# Patient Record
Sex: FEMALE | Race: WHITE | NOT HISPANIC OR LATINO | Employment: OTHER | URBAN - METROPOLITAN AREA
[De-identification: names, ages, dates, MRNs, and addresses within clinical notes are randomized per-mention and may not be internally consistent; named-entity substitution may affect disease eponyms.]

---

## 2023-03-09 LAB
ANION GAP IN SER/PLAS: 14 MMOL/L (ref 10–20)
CALCIUM (MG/DL) IN SER/PLAS: 9.3 MG/DL (ref 8.6–10.6)
CARBON DIOXIDE, TOTAL (MMOL/L) IN SER/PLAS: 29 MMOL/L (ref 21–32)
CHLORIDE (MMOL/L) IN SER/PLAS: 100 MMOL/L (ref 98–107)
CHOLESTEROL (MG/DL) IN SER/PLAS: 224 MG/DL (ref 0–199)
CHOLESTEROL IN HDL (MG/DL) IN SER/PLAS: 71.4 MG/DL
CHOLESTEROL/HDL RATIO: 3.1
CREATININE (MG/DL) IN SER/PLAS: 0.58 MG/DL (ref 0.5–1.05)
GFR FEMALE: >90 ML/MIN/1.73M2
GLUCOSE (MG/DL) IN SER/PLAS: 95 MG/DL (ref 74–99)
LDL: 130 MG/DL (ref 0–99)
POTASSIUM (MMOL/L) IN SER/PLAS: 5 MMOL/L (ref 3.5–5.3)
SODIUM (MMOL/L) IN SER/PLAS: 138 MMOL/L (ref 136–145)
THYROTROPIN (MIU/L) IN SER/PLAS BY DETECTION LIMIT <= 0.05 MIU/L: 1.9 MIU/L (ref 0.44–3.98)
TRIGLYCERIDE (MG/DL) IN SER/PLAS: 114 MG/DL (ref 0–149)
UREA NITROGEN (MG/DL) IN SER/PLAS: 17 MG/DL (ref 6–23)
VLDL: 23 MG/DL (ref 0–40)

## 2023-07-25 ENCOUNTER — OFFICE VISIT (OUTPATIENT)
Dept: PRIMARY CARE | Facility: CLINIC | Age: 68
End: 2023-07-25
Payer: MEDICARE

## 2023-07-25 VITALS
HEART RATE: 64 BPM | WEIGHT: 206 LBS | BODY MASS INDEX: 40.44 KG/M2 | DIASTOLIC BLOOD PRESSURE: 82 MMHG | HEIGHT: 60 IN | RESPIRATION RATE: 16 BRPM | SYSTOLIC BLOOD PRESSURE: 134 MMHG | OXYGEN SATURATION: 98 %

## 2023-07-25 DIAGNOSIS — M54.50 CHRONIC BILATERAL LOW BACK PAIN WITHOUT SCIATICA: Primary | ICD-10-CM

## 2023-07-25 DIAGNOSIS — Z12.31 ENCOUNTER FOR SCREENING MAMMOGRAM FOR MALIGNANT NEOPLASM OF BREAST: ICD-10-CM

## 2023-07-25 DIAGNOSIS — K21.9 GASTROESOPHAGEAL REFLUX DISEASE, UNSPECIFIED WHETHER ESOPHAGITIS PRESENT: ICD-10-CM

## 2023-07-25 DIAGNOSIS — M54.50 LUMBAR BACK PAIN: ICD-10-CM

## 2023-07-25 DIAGNOSIS — E03.9 HYPOTHYROIDISM, UNSPECIFIED TYPE: ICD-10-CM

## 2023-07-25 DIAGNOSIS — G89.29 CHRONIC BILATERAL LOW BACK PAIN WITHOUT SCIATICA: Primary | ICD-10-CM

## 2023-07-25 DIAGNOSIS — I10 PRIMARY HYPERTENSION: ICD-10-CM

## 2023-07-25 PROBLEM — J30.9 ALLERGIC RHINITIS: Status: ACTIVE | Noted: 2023-07-25

## 2023-07-25 PROBLEM — R51.9 HEADACHE: Status: ACTIVE | Noted: 2023-07-25

## 2023-07-25 PROBLEM — G25.81 RESTLESS LEG SYNDROME: Status: ACTIVE | Noted: 2023-07-25

## 2023-07-25 PROBLEM — N39.0 UTI (URINARY TRACT INFECTION): Status: ACTIVE | Noted: 2023-07-25

## 2023-07-25 PROBLEM — B00.1 COLD SORE: Status: ACTIVE | Noted: 2023-07-25

## 2023-07-25 PROBLEM — R53.83 FATIGUE: Status: ACTIVE | Noted: 2023-07-25

## 2023-07-25 PROBLEM — B02.9 SHINGLES: Status: ACTIVE | Noted: 2023-07-25

## 2023-07-25 PROBLEM — M79.10 MYALGIA: Status: ACTIVE | Noted: 2023-07-25

## 2023-07-25 PROBLEM — M54.16 LUMBAR NEURITIS: Status: ACTIVE | Noted: 2023-07-25

## 2023-07-25 PROBLEM — R19.5 POSITIVE COLORECTAL CANCER SCREENING USING COLOGUARD TEST: Status: ACTIVE | Noted: 2023-07-25

## 2023-07-25 PROCEDURE — 3075F SYST BP GE 130 - 139MM HG: CPT | Performed by: INTERNAL MEDICINE

## 2023-07-25 PROCEDURE — 1036F TOBACCO NON-USER: CPT | Performed by: INTERNAL MEDICINE

## 2023-07-25 PROCEDURE — 3079F DIAST BP 80-89 MM HG: CPT | Performed by: INTERNAL MEDICINE

## 2023-07-25 PROCEDURE — 99205 OFFICE O/P NEW HI 60 MIN: CPT | Performed by: INTERNAL MEDICINE

## 2023-07-25 PROCEDURE — 1125F AMNT PAIN NOTED PAIN PRSNT: CPT | Performed by: INTERNAL MEDICINE

## 2023-07-25 PROCEDURE — 1159F MED LIST DOCD IN RCRD: CPT | Performed by: INTERNAL MEDICINE

## 2023-07-25 PROCEDURE — 1160F RVW MEDS BY RX/DR IN RCRD: CPT | Performed by: INTERNAL MEDICINE

## 2023-07-25 RX ORDER — ATENOLOL 50 MG/1
TABLET ORAL
COMMUNITY
End: 2024-01-16 | Stop reason: SDUPTHER

## 2023-07-25 RX ORDER — OXYCODONE HYDROCHLORIDE 15 MG/1
15 TABLET ORAL EVERY 6 HOURS PRN
COMMUNITY
End: 2023-07-25 | Stop reason: SDUPTHER

## 2023-07-25 RX ORDER — ESOMEPRAZOLE MAGNESIUM 40 MG/1
1 CAPSULE, DELAYED RELEASE ORAL DAILY
COMMUNITY
Start: 2015-01-23 | End: 2023-09-21 | Stop reason: SDUPTHER

## 2023-07-25 RX ORDER — LEVOTHYROXINE SODIUM 88 UG/1
TABLET ORAL
COMMUNITY
End: 2024-01-25 | Stop reason: SDUPTHER

## 2023-07-25 RX ORDER — LISINOPRIL 5 MG/1
5 TABLET ORAL DAILY
COMMUNITY
End: 2023-09-05 | Stop reason: SDUPTHER

## 2023-07-25 RX ORDER — VALACYCLOVIR HYDROCHLORIDE 1 G/1
1 TABLET, FILM COATED ORAL 2 TIMES DAILY PRN
COMMUNITY
Start: 2022-01-17 | End: 2023-07-25 | Stop reason: ALTCHOICE

## 2023-07-25 RX ORDER — NALOXONE HYDROCHLORIDE 4 MG/.1ML
4 SPRAY NASAL AS NEEDED
Qty: 2 EACH | Refills: 0 | Status: SHIPPED | OUTPATIENT
Start: 2023-07-25 | End: 2023-11-07 | Stop reason: ALTCHOICE

## 2023-07-25 RX ORDER — OXYCODONE HYDROCHLORIDE 15 MG/1
15 TABLET ORAL EVERY 6 HOURS PRN
Qty: 30 TABLET | Refills: 0 | Status: SHIPPED | OUTPATIENT
Start: 2023-07-25 | End: 2023-07-26 | Stop reason: SDUPTHER

## 2023-07-25 RX ORDER — OMEPRAZOLE 40 MG/1
40 CAPSULE, DELAYED RELEASE ORAL DAILY
COMMUNITY
Start: 2022-12-08 | End: 2023-07-25 | Stop reason: ALTCHOICE

## 2023-07-25 RX ORDER — LORAZEPAM 0.5 MG/1
TABLET ORAL
COMMUNITY
Start: 2023-03-25 | End: 2023-07-25 | Stop reason: ALTCHOICE

## 2023-07-25 ASSESSMENT — PAIN SCALES - GENERAL: PAINLEVEL: 4

## 2023-07-25 ASSESSMENT — SOCIAL DETERMINANTS OF HEALTH (SDOH)
WITHIN THE LAST YEAR, HAVE YOU BEEN KICKED, HIT, SLAPPED, OR OTHERWISE PHYSICALLY HURT BY YOUR PARTNER OR EX-PARTNER?: NO
WITHIN THE LAST YEAR, HAVE YOU BEEN AFRAID OF YOUR PARTNER OR EX-PARTNER?: NO
WITHIN THE LAST YEAR, HAVE TO BEEN RAPED OR FORCED TO HAVE ANY KIND OF SEXUAL ACTIVITY BY YOUR PARTNER OR EX-PARTNER?: NO
WITHIN THE LAST YEAR, HAVE YOU BEEN HUMILIATED OR EMOTIONALLY ABUSED IN OTHER WAYS BY YOUR PARTNER OR EX-PARTNER?: NO

## 2023-07-25 ASSESSMENT — PATIENT HEALTH QUESTIONNAIRE - PHQ9
SUM OF ALL RESPONSES TO PHQ9 QUESTIONS 1 & 2: 0
2. FEELING DOWN, DEPRESSED OR HOPELESS: NOT AT ALL
1. LITTLE INTEREST OR PLEASURE IN DOING THINGS: NOT AT ALL

## 2023-07-26 ENCOUNTER — LAB (OUTPATIENT)
Dept: LAB | Facility: LAB | Age: 68
End: 2023-07-26
Payer: MEDICARE

## 2023-07-26 ENCOUNTER — OFFICE VISIT (OUTPATIENT)
Dept: PRIMARY CARE | Facility: CLINIC | Age: 68
End: 2023-07-26
Payer: MEDICARE

## 2023-07-26 VITALS — SYSTOLIC BLOOD PRESSURE: 146 MMHG | BODY MASS INDEX: 43.16 KG/M2 | WEIGHT: 221 LBS | DIASTOLIC BLOOD PRESSURE: 86 MMHG

## 2023-07-26 DIAGNOSIS — G89.29 OTHER CHRONIC PAIN: Primary | ICD-10-CM

## 2023-07-26 DIAGNOSIS — G89.29 OTHER CHRONIC PAIN: ICD-10-CM

## 2023-07-26 DIAGNOSIS — M25.50 ARTHRALGIA, UNSPECIFIED JOINT: ICD-10-CM

## 2023-07-26 LAB
RHEUMATOID FACTOR (IU/ML) IN SERUM OR PLASMA: <10 IU/ML (ref 0–15)
URATE (MG/DL) IN SER/PLAS: 4.5 MG/DL (ref 2.3–6.7)

## 2023-07-26 PROCEDURE — 1159F MED LIST DOCD IN RCRD: CPT | Performed by: STUDENT IN AN ORGANIZED HEALTH CARE EDUCATION/TRAINING PROGRAM

## 2023-07-26 PROCEDURE — 86039 ANTINUCLEAR ANTIBODIES (ANA): CPT

## 2023-07-26 PROCEDURE — 1160F RVW MEDS BY RX/DR IN RCRD: CPT | Performed by: STUDENT IN AN ORGANIZED HEALTH CARE EDUCATION/TRAINING PROGRAM

## 2023-07-26 PROCEDURE — 86225 DNA ANTIBODY NATIVE: CPT

## 2023-07-26 PROCEDURE — 86235 NUCLEAR ANTIGEN ANTIBODY: CPT

## 2023-07-26 PROCEDURE — 99214 OFFICE O/P EST MOD 30 MIN: CPT | Performed by: STUDENT IN AN ORGANIZED HEALTH CARE EDUCATION/TRAINING PROGRAM

## 2023-07-26 PROCEDURE — 1125F AMNT PAIN NOTED PAIN PRSNT: CPT | Performed by: STUDENT IN AN ORGANIZED HEALTH CARE EDUCATION/TRAINING PROGRAM

## 2023-07-26 PROCEDURE — 86431 RHEUMATOID FACTOR QUANT: CPT

## 2023-07-26 PROCEDURE — 3079F DIAST BP 80-89 MM HG: CPT | Performed by: STUDENT IN AN ORGANIZED HEALTH CARE EDUCATION/TRAINING PROGRAM

## 2023-07-26 PROCEDURE — 84550 ASSAY OF BLOOD/URIC ACID: CPT

## 2023-07-26 PROCEDURE — 3077F SYST BP >= 140 MM HG: CPT | Performed by: STUDENT IN AN ORGANIZED HEALTH CARE EDUCATION/TRAINING PROGRAM

## 2023-07-26 PROCEDURE — 86038 ANTINUCLEAR ANTIBODIES: CPT

## 2023-07-26 PROCEDURE — 1036F TOBACCO NON-USER: CPT | Performed by: STUDENT IN AN ORGANIZED HEALTH CARE EDUCATION/TRAINING PROGRAM

## 2023-07-26 PROCEDURE — 36415 COLL VENOUS BLD VENIPUNCTURE: CPT

## 2023-07-26 RX ORDER — OXYCODONE HYDROCHLORIDE 15 MG/1
15 TABLET ORAL EVERY 6 HOURS PRN
Qty: 90 TABLET | Refills: 0 | Status: SHIPPED | OUTPATIENT
Start: 2023-07-26 | End: 2023-08-18 | Stop reason: SDUPTHER

## 2023-07-26 NOTE — PROGRESS NOTES
Subjective   Patient ID: Fadumo Lassiter is a 68 y.o. female who presents for Establish Care and Continue pain medication.    HPI patient is a 68-year-old female with past medical history of hypertension, hypothyroidism, GERD, and chronic back pain presenting to the office today to establish care as a new patient.  The patient's PCP recently retired.  Has been on opiate pain medications for a number of years for chronic back pain. She is currently on oxycodone 15 mg every 6 hours as needed. Had been on oxycontin in the past and weaned off. Patient states that she is compliant with all of her other medications and takes them regularly.  The patient has no other acute complaints today.    Past medical history: As above  Past surgical history: Spinal fusion, laminectomy, bilateral hip replacement  Social history: Never smoker, social EtOH use, no illicit drug use  Family history: Hypertension    Review of Systems  10 point ROS was completed and is negative unless otherwise stated in the \[facility] [place] [facility]\""  Objective   /86   Wt 100 kg (221 lb)   BMI 43.16 kg/m²     Physical Exam  General: No acute distress  HEENT: EOMI  CV: Regular rate and rhythm, normal S1 and S2, no murmurs  Pulm: Clear to auscultation bilaterally, no wheezings, rales or rhonchi  Abd: Nondistended  MSK: 5/5 strength in all extremities  Skin: No rashes   Lymphatic: No lymphadenopathy  Assessment/Plan       Chronic low back pain  History of multiple spinal surgeries  -Follows with spine surgery  -Currently on  oxycodone 15mg q6h prn, had been prescribed by prior internist  -Had been on OxyContin in the past and was able to wean off  -OARRS report reviewed  -Discussed goal to wean off opiate pain medications within 6 months. Will transition oxycodone 15mg QID to 15mg TID over the next month with goal to continue to decrease by 20% per month  -Did note increase in restless legs when medication was decreased in the past, discussed ropinirole, would like to hold  off for now  -Had been on gabapentin and lyrica in the past that were stopped for side effects  -Consider pain management referral  -Ordered WHITNEY reflex BOBO, RF, CCP, uric acid and referral placed to rheumatology      Hypertension  -Continue lisinopril 5 mg daily  -Continue atenolol 50 mg daily    Hypothyroidism  -Continue levothyroxine 88 mcg daily    GERD  -Continue esomeprazole 40 mg daily    Health maintenance  -Mammogram previously ordered  -Colonoscopy 2022, follow in 10 years    RTC 1 month

## 2023-07-27 LAB
ANA PATTERN: ABNORMAL
ANA TITER: ABNORMAL
ANTI-CENTROMERE: <0.2 AI
ANTI-CHROMATIN: <0.2 AI
ANTI-DNA (DS): 1 IU/ML
ANTI-JO-1 IGG: <0.2 AI
ANTI-NUCLEAR ANTIBODY (ANA): POSITIVE
ANTI-RIBOSOMAL P: <0.2 AI
ANTI-RNP: <0.2 AI
ANTI-SCL-70: <0.2 AI
ANTI-SM/RNP: <0.2 AI
ANTI-SM: <0.2 AI
ANTI-SSA: <0.2 AI
ANTI-SSB: <0.2 AI

## 2023-07-29 NOTE — PROGRESS NOTES
Subjective   Fadumo Lassiter is a 68 y.o. female who presents for New Patient Visit.  Patient presents to [facility].  She was previously seeing Dr. Fischer.  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  She reports multiple back surgeries.  She follows with Dae Akbar and [facility].  She has seen pain management in the past, per her former primary was managing her chronic opioids.  She states she has tried gabapentin in the past but it did not work.  Advised that this physician does not prescribe chronic opioids and she would be referred to pain management.    Patient states she wants a primary physician who will manage her chronic opioids as well.  She was given multiple referrals.        Objective     /82 (BP Location: Right arm, Patient Position: Sitting, BP Cuff Size: Adult)   Pulse 64   Resp 16   Ht 1.524 m (5')   Wt 93.4 kg (206 lb)   SpO2 98%   BMI 40.23 kg/m²      Physical Exam  Constitutional:       Appearance: Normal appearance.   HENT:      Head: Normocephalic and atraumatic.      Nose: Nose normal.      Mouth/Throat:      Mouth: Mucous membranes are moist.      Pharynx: Oropharynx is clear.   Eyes:      Extraocular Movements: Extraocular movements intact.      Pupils: Pupils are equal, round, and reactive to light.   Cardiovascular:      Rate and Rhythm: Normal rate and regular rhythm.   Pulmonary:      Effort: No respiratory distress.      Breath sounds: Normal breath sounds. No wheezing, rhonchi or rales.   Abdominal:      General: Bowel sounds are normal. There is no distension.      Palpations: Abdomen is soft.      Tenderness: There is no abdominal tenderness. There is no guarding.   Musculoskeletal:      Right lower leg: No edema.      Left lower leg: No edema.   Skin:     General: Skin is warm and dry.   Neurological:      Mental Status: She is alert and oriented to person, place, and time. Mental status is at baseline.   Psychiatric:         Mood and Affect:  Mood normal.         Behavior: Behavior normal.         Assessment/Plan   Problem List Items Addressed This Visit       GERD (gastroesophageal reflux disease)    Hypertension    Hypothyroidism    Lumbar back pain     Other Visit Diagnoses       Chronic bilateral low back pain without sciatica    -  Primary    Relevant Medications    naloxone (Narcan) 4 mg/0.1 mL nasal spray    oxyCODONE (Roxicodone) 15 mg immediate release tablet    Other Relevant Orders    Referral to Pain Medicine    Encounter for screening mammogram for malignant neoplasm of breast        Relevant Orders    BI mammo bilateral screening tomosynthesis          Patient given a prescription for opioids to avoid withdrawal  Referred to pain management  Given multiple referrals for new PCP       Miguel Angel Suárez DO

## 2023-08-07 ENCOUNTER — TELEPHONE (OUTPATIENT)
Dept: PRIMARY CARE | Facility: CLINIC | Age: 68
End: 2023-08-07
Payer: MEDICARE

## 2023-08-07 NOTE — TELEPHONE ENCOUNTER
(Has an upcoming appt on 9/5)  calling today and having upper neck & back pain that is causing muscle spasms.

## 2023-08-08 ENCOUNTER — TELEPHONE (OUTPATIENT)
Dept: PRIMARY CARE | Facility: CLINIC | Age: 68
End: 2023-08-08
Payer: MEDICARE

## 2023-08-08 NOTE — TELEPHONE ENCOUNTER
Patient would like a call back from you, has injured neck,  back, shoulder and very painful. Having a hard time walking, eating and sleeping. Did try aleeve, and heat. Not helping,  [phone]

## 2023-08-18 DIAGNOSIS — M54.50 CHRONIC BILATERAL LOW BACK PAIN WITHOUT SCIATICA: ICD-10-CM

## 2023-08-18 DIAGNOSIS — G89.29 CHRONIC BILATERAL LOW BACK PAIN WITHOUT SCIATICA: ICD-10-CM

## 2023-08-20 RX ORDER — OXYCODONE HYDROCHLORIDE 15 MG/1
15 TABLET ORAL 3 TIMES DAILY PRN
Qty: 90 TABLET | Refills: 0 | Status: SHIPPED | OUTPATIENT
Start: 2023-08-20 | End: 2023-09-21 | Stop reason: SDUPTHER

## 2023-08-22 PROBLEM — N76.0 VULVOVAGINITIS: Status: ACTIVE | Noted: 2023-08-22

## 2023-08-22 PROBLEM — N64.4 BREAST PAIN: Status: ACTIVE | Noted: 2023-08-22

## 2023-08-22 PROBLEM — N60.09 CYST OF BREAST: Status: ACTIVE | Noted: 2023-08-22

## 2023-08-22 PROBLEM — N89.8 VAGINAL DISCHARGE: Status: ACTIVE | Noted: 2023-08-22

## 2023-08-22 PROBLEM — B35.9 DERMATOPHYTOSIS: Status: ACTIVE | Noted: 2023-08-22

## 2023-08-22 PROBLEM — M48.02 CERVICAL STENOSIS OF SPINE: Status: ACTIVE | Noted: 2023-08-22

## 2023-08-22 RX ORDER — CYCLOBENZAPRINE HCL 10 MG
1 TABLET ORAL 2 TIMES DAILY
COMMUNITY
Start: 2023-08-07 | End: 2023-09-05 | Stop reason: ALTCHOICE

## 2023-08-22 RX ORDER — LORAZEPAM 0.5 MG/1
1 TABLET ORAL
COMMUNITY
End: 2023-09-05 | Stop reason: ALTCHOICE

## 2023-08-22 RX ORDER — METHOCARBAMOL 500 MG/1
TABLET, FILM COATED ORAL AS NEEDED
COMMUNITY
End: 2023-09-05 | Stop reason: ALTCHOICE

## 2023-08-22 RX ORDER — FLUTICASONE PROPIONATE 50 MCG
2 SPRAY, SUSPENSION (ML) NASAL DAILY
COMMUNITY
End: 2023-09-05 | Stop reason: ALTCHOICE

## 2023-08-22 RX ORDER — METHYLPREDNISOLONE 4 MG/1
TABLET ORAL
COMMUNITY
Start: 2023-08-07 | End: 2023-09-05 | Stop reason: ALTCHOICE

## 2023-08-22 RX ORDER — NYSTATIN 100000 U/G
1 CREAM TOPICAL 2 TIMES DAILY
COMMUNITY
Start: 2017-09-21 | End: 2023-09-05 | Stop reason: ALTCHOICE

## 2023-08-29 RX ORDER — BIMATOPROST 3 UG/ML
1 SOLUTION TOPICAL DAILY
COMMUNITY
End: 2023-10-03 | Stop reason: ALTCHOICE

## 2023-08-29 RX ORDER — VALACYCLOVIR HYDROCHLORIDE 1 G/1
1000 TABLET, FILM COATED ORAL 2 TIMES DAILY PRN
COMMUNITY
End: 2023-09-05 | Stop reason: ALTCHOICE

## 2023-09-05 ENCOUNTER — APPOINTMENT (OUTPATIENT)
Dept: PRIMARY CARE | Facility: CLINIC | Age: 68
End: 2023-09-05
Payer: MEDICARE

## 2023-09-05 ENCOUNTER — OFFICE VISIT (OUTPATIENT)
Dept: PRIMARY CARE | Facility: CLINIC | Age: 68
End: 2023-09-05
Payer: MEDICARE

## 2023-09-05 VITALS — SYSTOLIC BLOOD PRESSURE: 150 MMHG | DIASTOLIC BLOOD PRESSURE: 98 MMHG | BODY MASS INDEX: 42.77 KG/M2 | WEIGHT: 219 LBS

## 2023-09-05 DIAGNOSIS — Z00.00 ROUTINE GENERAL MEDICAL EXAMINATION AT HEALTH CARE FACILITY: Primary | ICD-10-CM

## 2023-09-05 DIAGNOSIS — I10 HYPERTENSION, UNSPECIFIED TYPE: ICD-10-CM

## 2023-09-05 DIAGNOSIS — M54.50 LUMBAR BACK PAIN: ICD-10-CM

## 2023-09-05 DIAGNOSIS — M54.2 NECK PAIN: ICD-10-CM

## 2023-09-05 PROCEDURE — G0439 PPPS, SUBSEQ VISIT: HCPCS | Performed by: STUDENT IN AN ORGANIZED HEALTH CARE EDUCATION/TRAINING PROGRAM

## 2023-09-05 PROCEDURE — 3077F SYST BP >= 140 MM HG: CPT | Performed by: STUDENT IN AN ORGANIZED HEALTH CARE EDUCATION/TRAINING PROGRAM

## 2023-09-05 PROCEDURE — 1170F FXNL STATUS ASSESSED: CPT | Performed by: STUDENT IN AN ORGANIZED HEALTH CARE EDUCATION/TRAINING PROGRAM

## 2023-09-05 PROCEDURE — 1036F TOBACCO NON-USER: CPT | Performed by: STUDENT IN AN ORGANIZED HEALTH CARE EDUCATION/TRAINING PROGRAM

## 2023-09-05 PROCEDURE — 1125F AMNT PAIN NOTED PAIN PRSNT: CPT | Performed by: STUDENT IN AN ORGANIZED HEALTH CARE EDUCATION/TRAINING PROGRAM

## 2023-09-05 PROCEDURE — 99214 OFFICE O/P EST MOD 30 MIN: CPT | Performed by: STUDENT IN AN ORGANIZED HEALTH CARE EDUCATION/TRAINING PROGRAM

## 2023-09-05 PROCEDURE — 3079F DIAST BP 80-89 MM HG: CPT | Performed by: STUDENT IN AN ORGANIZED HEALTH CARE EDUCATION/TRAINING PROGRAM

## 2023-09-05 PROCEDURE — 3080F DIAST BP >= 90 MM HG: CPT | Performed by: STUDENT IN AN ORGANIZED HEALTH CARE EDUCATION/TRAINING PROGRAM

## 2023-09-05 PROCEDURE — 1160F RVW MEDS BY RX/DR IN RCRD: CPT | Performed by: STUDENT IN AN ORGANIZED HEALTH CARE EDUCATION/TRAINING PROGRAM

## 2023-09-05 PROCEDURE — 1159F MED LIST DOCD IN RCRD: CPT | Performed by: STUDENT IN AN ORGANIZED HEALTH CARE EDUCATION/TRAINING PROGRAM

## 2023-09-05 RX ORDER — LISINOPRIL 5 MG/1
5 TABLET ORAL DAILY
Qty: 90 TABLET | Refills: 1 | Status: SHIPPED | OUTPATIENT
Start: 2023-09-05 | End: 2024-02-27 | Stop reason: SDUPTHER

## 2023-09-05 ASSESSMENT — ACTIVITIES OF DAILY LIVING (ADL)
MANAGING_FINANCES: INDEPENDENT
TAKING_MEDICATION: INDEPENDENT
GROCERY_SHOPPING: INDEPENDENT
DOING_HOUSEWORK: INDEPENDENT
DRESSING: INDEPENDENT
BATHING: INDEPENDENT

## 2023-09-05 ASSESSMENT — PATIENT HEALTH QUESTIONNAIRE - PHQ9
2. FEELING DOWN, DEPRESSED OR HOPELESS: NOT AT ALL
SUM OF ALL RESPONSES TO PHQ9 QUESTIONS 1 AND 2: 0
1. LITTLE INTEREST OR PLEASURE IN DOING THINGS: NOT AT ALL

## 2023-09-05 NOTE — PROGRESS NOTES
Subjective   Reason for Visit: Fadumo Lassiter is an 68 y.o. female here for a Medicare Wellness visit.     Past Medical, Surgical, and Family History reviewed and updated in chart.    Reviewed all medications by prescribing practitioner or clinical pharmacist (such as prescriptions, OTCs, herbal therapies and supplements) and documented in the medical record.    HPI    Presents for follow-up. Since last appointment had an acute flare of neck pain, had been prescribed muscle relaxer and steroids with some improvement. Has not been able to exercise as usual since that time as well as decrease in pain medication         Patient Care Team:  Jim Sumner MD as PCP - General (Internal Medicine)     Review of Systems    8 point review of systems is otherwise negative unless mentioned on HPI      Objective   Vitals:  BP (!) 150/98   Wt 99.3 kg (219 lb)   BMI 42.77 kg/m²       Physical Exam    General: No acute distress  HEENT: EOMI  CV: Regular rate and rhythm, normal S1 and S2, no murmurs  Pulm: Clear to auscultation bilaterally, no wheezings, rales or rhonchi  Abd: Nondistended  MSK: 5/5 strength in all extremities  Skin: No rashes   Lymphatic: No lymphadenopathy      Assessment/Plan   Problem List Items Addressed This Visit          Musculoskeletal and Injuries    Lumbar back pain     Other Visit Diagnoses       Routine general medical examination at health care facility    -  Primary    Hypertension, unspecified type        Neck pain              Chronic low back pain  History of multiple spinal surgeries  -Follows with spine surgery  -Had been on OxyContin in the past and was able to wean off  -OARRS report reviewed  -Discussed goal to wean off opiate pain medications within 6 months. At last visit oxycodone 15mg QID to 15mg TID over the next month with goal to continue to decrease by 20% per month  -Will keep dose the same for now, next month will consider maintaining the 15mg dose and taking 2.5mg tablets  daily  -Did note increase in restless legs when medication was decreased in the past, discussed ropinirole, would like to hold off for now  -Had been on gabapentin and lyrica in the past that were stopped for side effects  -Positive WHITNEY, plans to follow-up with rheumatology  -Not currently interested in injections at this time to follow-up with pain management       Hypertension  -Continue lisinopril 5 mg daily  -Continue atenolol 25 mg daily (takes 1/2 50mg atenolol), may consider increasing back to 50mg in the future       Hypothyroidism  -Continue levothyroxine 88 mcg daily     GERD  -Continue esomeprazole 40 mg daily     Health maintenance  -Mammogram previously ordered  -Colonoscopy 2022, follow in 10 years     RTC 1 month

## 2023-09-15 ENCOUNTER — TELEPHONE (OUTPATIENT)
Dept: PRIMARY CARE | Facility: CLINIC | Age: 68
End: 2023-09-15
Payer: MEDICARE

## 2023-09-15 DIAGNOSIS — N39.0 URINARY TRACT INFECTION WITHOUT HEMATURIA, SITE UNSPECIFIED: Primary | ICD-10-CM

## 2023-09-15 RX ORDER — SULFAMETHOXAZOLE AND TRIMETHOPRIM 800; 160 MG/1; MG/1
1 TABLET ORAL 2 TIMES DAILY
Qty: 6 TABLET | Refills: 0 | Status: SHIPPED | OUTPATIENT
Start: 2023-09-15 | End: 2023-09-18

## 2023-09-15 NOTE — TELEPHONE ENCOUNTER
Patient is experiencing an UTI and asked if you could call in something to her [facility] [place] at [facility].

## 2023-09-21 DIAGNOSIS — G89.29 CHRONIC BILATERAL LOW BACK PAIN WITHOUT SCIATICA: ICD-10-CM

## 2023-09-21 DIAGNOSIS — M54.50 CHRONIC BILATERAL LOW BACK PAIN WITHOUT SCIATICA: ICD-10-CM

## 2023-09-21 DIAGNOSIS — K21.9 GASTROESOPHAGEAL REFLUX DISEASE WITHOUT ESOPHAGITIS: Primary | ICD-10-CM

## 2023-09-21 RX ORDER — ESOMEPRAZOLE MAGNESIUM 40 MG/1
40 CAPSULE, DELAYED RELEASE ORAL DAILY
Qty: 30 CAPSULE | Refills: 0 | Status: SHIPPED | OUTPATIENT
Start: 2023-09-21 | End: 2023-10-31 | Stop reason: SDUPTHER

## 2023-09-21 RX ORDER — OXYCODONE HYDROCHLORIDE 15 MG/1
15 TABLET ORAL 3 TIMES DAILY PRN
Qty: 90 TABLET | Refills: 0 | Status: SHIPPED | OUTPATIENT
Start: 2023-09-21 | End: 2023-10-24 | Stop reason: SDUPTHER

## 2023-09-21 NOTE — TELEPHONE ENCOUNTER
Requesting refills on esomprazole (which I ordered) also requesting refills on oxycodone (what was discussed in office visit)  Just so you know, patient does have a drug contract/screen with dr. Javi dominguez. (Did not order)

## 2023-10-03 ENCOUNTER — LAB (OUTPATIENT)
Dept: LAB | Facility: LAB | Age: 68
End: 2023-10-03
Payer: MEDICARE

## 2023-10-03 ENCOUNTER — OFFICE VISIT (OUTPATIENT)
Dept: RHEUMATOLOGY | Facility: CLINIC | Age: 68
End: 2023-10-03
Payer: MEDICARE

## 2023-10-03 VITALS
BODY MASS INDEX: 43.59 KG/M2 | WEIGHT: 222 LBS | SYSTOLIC BLOOD PRESSURE: 145 MMHG | DIASTOLIC BLOOD PRESSURE: 83 MMHG | TEMPERATURE: 98.4 F | HEART RATE: 62 BPM | HEIGHT: 60 IN

## 2023-10-03 DIAGNOSIS — M25.50 ARTHRALGIA, UNSPECIFIED JOINT: ICD-10-CM

## 2023-10-03 DIAGNOSIS — M25.50 ARTHRALGIA, UNSPECIFIED JOINT: Primary | ICD-10-CM

## 2023-10-03 LAB
ALBUMIN SERPL BCP-MCNC: 4 G/DL (ref 3.4–5)
ALP SERPL-CCNC: 144 U/L (ref 33–136)
ALT SERPL W P-5'-P-CCNC: 17 U/L (ref 7–45)
ANION GAP SERPL CALC-SCNC: 13 MMOL/L (ref 10–20)
AST SERPL W P-5'-P-CCNC: 19 U/L (ref 9–39)
BASOPHILS # BLD AUTO: 0.06 X10*3/UL (ref 0–0.1)
BASOPHILS NFR BLD AUTO: 0.8 %
BILIRUB SERPL-MCNC: 0.5 MG/DL (ref 0–1.2)
BUN SERPL-MCNC: 16 MG/DL (ref 6–23)
CALCIUM SERPL-MCNC: 9.2 MG/DL (ref 8.6–10.3)
CHLORIDE SERPL-SCNC: 103 MMOL/L (ref 98–107)
CO2 SERPL-SCNC: 29 MMOL/L (ref 21–32)
CREAT SERPL-MCNC: 0.66 MG/DL (ref 0.5–1.05)
CRP SERPL-MCNC: 0.57 MG/DL
EOSINOPHIL # BLD AUTO: 0.25 X10*3/UL (ref 0–0.7)
EOSINOPHIL NFR BLD AUTO: 3.4 %
ERYTHROCYTE [DISTWIDTH] IN BLOOD BY AUTOMATED COUNT: 12.6 % (ref 11.5–14.5)
ERYTHROCYTE [SEDIMENTATION RATE] IN BLOOD BY WESTERGREN METHOD: 6 MM/H (ref 0–30)
GFR SERPL CREATININE-BSD FRML MDRD: >90 ML/MIN/1.73M*2
GLUCOSE SERPL-MCNC: 101 MG/DL (ref 74–99)
HCT VFR BLD AUTO: 41.7 % (ref 36–46)
HGB BLD-MCNC: 13.7 G/DL (ref 12–16)
IMM GRANULOCYTES # BLD AUTO: 0.02 X10*3/UL (ref 0–0.7)
IMM GRANULOCYTES NFR BLD AUTO: 0.3 % (ref 0–0.9)
LYMPHOCYTES # BLD AUTO: 1.95 X10*3/UL (ref 1.2–4.8)
LYMPHOCYTES NFR BLD AUTO: 26.4 %
MCH RBC QN AUTO: 30.2 PG (ref 26–34)
MCHC RBC AUTO-ENTMCNC: 32.9 G/DL (ref 32–36)
MCV RBC AUTO: 92 FL (ref 80–100)
MONOCYTES # BLD AUTO: 0.57 X10*3/UL (ref 0.1–1)
MONOCYTES NFR BLD AUTO: 7.7 %
NEUTROPHILS # BLD AUTO: 4.54 X10*3/UL (ref 1.2–7.7)
NEUTROPHILS NFR BLD AUTO: 61.4 %
NRBC BLD-RTO: 0 /100 WBCS (ref 0–0)
PLATELET # BLD AUTO: 311 X10*3/UL (ref 150–450)
PMV BLD AUTO: 9.6 FL (ref 7.5–11.5)
POTASSIUM SERPL-SCNC: 5.3 MMOL/L (ref 3.5–5.3)
PROT SERPL-MCNC: 6.7 G/DL (ref 6.4–8.2)
RBC # BLD AUTO: 4.54 X10*6/UL (ref 4–5.2)
SODIUM SERPL-SCNC: 140 MMOL/L (ref 136–145)
WBC # BLD AUTO: 7.4 X10*3/UL (ref 4.4–11.3)

## 2023-10-03 PROCEDURE — 1036F TOBACCO NON-USER: CPT | Performed by: PHYSICIAN ASSISTANT

## 2023-10-03 PROCEDURE — 3079F DIAST BP 80-89 MM HG: CPT | Performed by: PHYSICIAN ASSISTANT

## 2023-10-03 PROCEDURE — 81001 URINALYSIS AUTO W/SCOPE: CPT

## 2023-10-03 PROCEDURE — 36415 COLL VENOUS BLD VENIPUNCTURE: CPT

## 2023-10-03 PROCEDURE — 1159F MED LIST DOCD IN RCRD: CPT | Performed by: PHYSICIAN ASSISTANT

## 2023-10-03 PROCEDURE — 82570 ASSAY OF URINE CREATININE: CPT

## 2023-10-03 PROCEDURE — 1125F AMNT PAIN NOTED PAIN PRSNT: CPT | Performed by: PHYSICIAN ASSISTANT

## 2023-10-03 PROCEDURE — 1160F RVW MEDS BY RX/DR IN RCRD: CPT | Performed by: PHYSICIAN ASSISTANT

## 2023-10-03 PROCEDURE — 84156 ASSAY OF PROTEIN URINE: CPT

## 2023-10-03 PROCEDURE — 99205 OFFICE O/P NEW HI 60 MIN: CPT | Performed by: PHYSICIAN ASSISTANT

## 2023-10-03 PROCEDURE — 3077F SYST BP >= 140 MM HG: CPT | Performed by: PHYSICIAN ASSISTANT

## 2023-10-03 NOTE — PROGRESS NOTES
69 y/o female patient referred by Dr. Sumner for arthralgia and positive WHITNEY    Patient reports significant back pain, often radiates down the back of her legs into her toes. She also has pain in her neck radiating down her arms. Multiple spinal fusions & laminectomy, prior injections. She is following with her spine surgeons. She reports bilateral shoulder, hip, and knee replacements. Her pain is aggravated by specific movements. Her body pain is worst at the end of the day. No significant joint swelling. Mild morning stiffness in hands that occurs rarely, less than a few minutes.    She is taking oxycodone for her back pain. She is working with PCP to taper down.    Denies: chest pain, shortness of breath, abdominal pain, bloody stool, malar rash, photosensitivity, other rash, recurrent ulcers in mouth / nose, Raynauds    Medical: spina bifida, hypothyroid, osteoarthritis, GERD, hypertension, benign spine tumor, restless leg syndrome  Surgical: b/l shoulder arthroplasty, b/l knee arthroplasty, b/l hip arthoplasty, ovarian cyst excision, nose surgery  Social: retired from marketing, no tobacco use, occasional red wine  Family: no SLE, no RA, no PsA, no UC, no Crohns    WHITNEY 1:80, BOBO negative  Uric Acid WNL  RF negative    ROS  As per the HPI, otherwise negative.    PE:  Constitutional: Well developed, in no acute distress, alert and cooperative  Eyes: Anicteric sclerae  ENMT: No malar rash  Respiratory/Thorax: Patent airways, symmetric chest with good expansion  Cardiovascular: Regular rate   Neurological: alert and oriented x 3  Psychological: Appropriate mood and behavior  Skin: Warm and dry, no lesions, no rashes, no ulceration    Musculoskeletal -  Hands/Fingers: + Heberden & Darrius nodes. + CMC squaring & grind. No active synovitis of the DIP, PIP, or MCP joints with palpation. No pitting or spooning of the nails bilaterally. No clubbing/cyanosis. No erythema, no swelling, or pain with palpation, full  ROM.  Wrists: No erythema, no swelling, or pain with palpation, full ROM.  Elbows: No erythema, no swelling, or pain with palpation, full ROM.  Shoulders: No swelling, or pain with palpation, full ROM.  Lower extremities: No peripheral edema  Hips: No obvious deformities.   Knees: No erythema, no swelling, or pain with palpation, full ROM.  Ankles, Feet: No erythema, no swelling, or pain with palpation, full ROM. No ulceration.  Normal gait     Assessment:  Positive WHITNEY - will complete additional workup.    She is following closely with orthopedic team for her spine, she has PT referral from PCP.    Joint pain is consistent with osteoarthritis. We discussed topical diclofenac sodium gel on her finger joints in addition to her other strategies. No signs of inflammatory arthritis.    You can schedule an appointment by calling [phone]  You can reach the rheumatology office by calling [phone]    Plan:  - Lab ordered  - I will call you when results return    Norma Briones PA-C  Rheumatology     10/4: Called patient and reviewed results. No signs of rheumatologic WHITNEY related disorder. All of her questions and concerns were addressed. She will follow up if new concerns develop.

## 2023-10-04 LAB
C3 SERPL-MCNC: 126 MG/DL (ref 87–200)
C4 SERPL-MCNC: 40 MG/DL (ref 10–50)
CREAT UR-MCNC: 129.8 MG/DL (ref 20–320)
MUCOUS THREADS #/AREA URNS AUTO: NORMAL /LPF
PROT UR-ACNC: 11 MG/DL (ref 5–24)
PROT/CREAT UR: 0.08 MG/MG CREAT (ref 0–0.17)
RBC #/AREA URNS AUTO: NORMAL /HPF
SQUAMOUS #/AREA URNS AUTO: NORMAL /HPF
WBC #/AREA URNS AUTO: NORMAL /HPF

## 2023-10-10 ENCOUNTER — OFFICE VISIT (OUTPATIENT)
Dept: PRIMARY CARE | Facility: CLINIC | Age: 68
End: 2023-10-10
Payer: MEDICARE

## 2023-10-10 VITALS — BODY MASS INDEX: 44.33 KG/M2 | SYSTOLIC BLOOD PRESSURE: 153 MMHG | DIASTOLIC BLOOD PRESSURE: 80 MMHG | WEIGHT: 227 LBS

## 2023-10-10 DIAGNOSIS — M54.50 LUMBAR BACK PAIN: ICD-10-CM

## 2023-10-10 DIAGNOSIS — R30.0 DYSURIA: Primary | ICD-10-CM

## 2023-10-10 PROCEDURE — 1159F MED LIST DOCD IN RCRD: CPT | Performed by: STUDENT IN AN ORGANIZED HEALTH CARE EDUCATION/TRAINING PROGRAM

## 2023-10-10 PROCEDURE — 1036F TOBACCO NON-USER: CPT | Performed by: STUDENT IN AN ORGANIZED HEALTH CARE EDUCATION/TRAINING PROGRAM

## 2023-10-10 PROCEDURE — 1125F AMNT PAIN NOTED PAIN PRSNT: CPT | Performed by: STUDENT IN AN ORGANIZED HEALTH CARE EDUCATION/TRAINING PROGRAM

## 2023-10-10 PROCEDURE — 99213 OFFICE O/P EST LOW 20 MIN: CPT | Performed by: STUDENT IN AN ORGANIZED HEALTH CARE EDUCATION/TRAINING PROGRAM

## 2023-10-10 PROCEDURE — 3079F DIAST BP 80-89 MM HG: CPT | Performed by: STUDENT IN AN ORGANIZED HEALTH CARE EDUCATION/TRAINING PROGRAM

## 2023-10-10 PROCEDURE — 1160F RVW MEDS BY RX/DR IN RCRD: CPT | Performed by: STUDENT IN AN ORGANIZED HEALTH CARE EDUCATION/TRAINING PROGRAM

## 2023-10-10 PROCEDURE — 3077F SYST BP >= 140 MM HG: CPT | Performed by: STUDENT IN AN ORGANIZED HEALTH CARE EDUCATION/TRAINING PROGRAM

## 2023-10-10 NOTE — PROGRESS NOTES
Follow up    Subjective   Patient ID: Fadumo Lassiter is a 68 y.o. female who presents for Follow-up.    HPI     Presents for followup. Since last visit has seen rheumatology with reassuring workup. Considering starting silvina chi. Motivated to decrease use of pain medications. Uses naproxen at times which has been helpful. Has had some foot cramping and restless legs has been more noticeable and pain medications are decreasing.     Review of Systems    8 point review of systems is otherwise negative unless mentioned on HPI      Objective   /80   Wt 103 kg (227 lb)   BMI 44.33 kg/m²     Physical Exam    General: No acute distress  HEENT: EOMI  Abd: Nondistended  MSK: 5/5 strength in all extremities  Skin: No rashes   Lymphatic: No lymphadenopathy      Assessment/Plan       Chronic low back pain  History of multiple spinal surgeries  -Follows with spine surgery  -Had been on OxyContin in the past and was able to wean off  -OARRS report reviewed  -Discussed goal to wean off opiate pain medications within 6 months. At last visit oxycodone 15mg QID to 15mg TID over the next month with goal to continue to decrease by 20% per month  -Will keep dose the same for now, next month will consider maintaining the 15mg dose and taking 2.5 tablets daily. Likely would decrease to the 1/2 for the middle of the day dose as next step  -Did note increase in restless legs when medication was decreased in the past, discussed ropinirole, would like to hold off for now  -Had been on gabapentin and lyrica in the past that were stopped for side effects  -Positive WHITNEY, has seen rheumatology with reassuring workup, considering starting silvina chi  -Not currently interested in injections at this time to follow-up with pain management   -Takes naproxen sparingly, discussed option of other NSAIDs (meloxicam or diclofenac), would like to continue with naproxen as needed for now       Hypertension  -Continue lisinopril 5 mg daily  -Continue  atenolol 25 mg daily (takes 1/2 50mg atenolol), may consider increasing back to 50mg in the future      Prior episode of dysuria  -Ordered UA/culture for if recurs again  -Discussed cranberry and D mannose supplements      Hypothyroidism  -Continue levothyroxine 88 mcg daily     GERD  -Continue esomeprazole 40 mg daily     Health maintenance  -Mammogram previously ordered  -Colonoscopy 2022, follow in 10 years     RTC 1 month

## 2023-10-13 ENCOUNTER — APPOINTMENT (OUTPATIENT)
Dept: ORTHOPEDIC SURGERY | Facility: CLINIC | Age: 68
End: 2023-10-13
Payer: MEDICARE

## 2023-10-24 DIAGNOSIS — G89.29 CHRONIC BILATERAL LOW BACK PAIN WITHOUT SCIATICA: ICD-10-CM

## 2023-10-24 DIAGNOSIS — M54.50 CHRONIC BILATERAL LOW BACK PAIN WITHOUT SCIATICA: ICD-10-CM

## 2023-10-25 RX ORDER — OXYCODONE HYDROCHLORIDE 15 MG/1
15 TABLET ORAL 3 TIMES DAILY PRN
Qty: 90 TABLET | Refills: 0 | Status: SHIPPED | OUTPATIENT
Start: 2023-10-25 | End: 2023-11-07 | Stop reason: ALTCHOICE

## 2023-10-31 DIAGNOSIS — K21.9 GASTROESOPHAGEAL REFLUX DISEASE WITHOUT ESOPHAGITIS: ICD-10-CM

## 2023-10-31 RX ORDER — ESOMEPRAZOLE MAGNESIUM 40 MG/1
40 CAPSULE, DELAYED RELEASE ORAL DAILY
Qty: 90 CAPSULE | Refills: 1 | Status: SHIPPED | OUTPATIENT
Start: 2023-10-31 | End: 2024-05-03 | Stop reason: SDUPTHER

## 2023-11-06 PROBLEM — N64.4 BREAST PAIN: Status: RESOLVED | Noted: 2023-08-22 | Resolved: 2023-11-06

## 2023-11-06 PROBLEM — R53.83 FATIGUE: Status: RESOLVED | Noted: 2023-07-25 | Resolved: 2023-11-06

## 2023-11-06 PROBLEM — B35.9 DERMATOPHYTOSIS: Status: RESOLVED | Noted: 2023-08-22 | Resolved: 2023-11-06

## 2023-11-06 PROBLEM — N60.09 CYST, BREAST: Status: RESOLVED | Noted: 2023-08-22 | Resolved: 2023-11-06

## 2023-11-06 PROBLEM — B00.1 COLD SORE: Status: RESOLVED | Noted: 2023-07-25 | Resolved: 2023-11-06

## 2023-11-06 PROBLEM — R51.9 HEADACHE: Status: RESOLVED | Noted: 2023-07-25 | Resolved: 2023-11-06

## 2023-11-06 PROBLEM — N39.0 UTI (URINARY TRACT INFECTION): Status: RESOLVED | Noted: 2023-07-25 | Resolved: 2023-11-06

## 2023-11-06 PROBLEM — R10.2 PELVIC PAIN IN FEMALE: Status: RESOLVED | Noted: 2023-11-06 | Resolved: 2023-11-06

## 2023-11-06 PROBLEM — N89.8 VAGINAL DISCHARGE: Status: RESOLVED | Noted: 2023-08-22 | Resolved: 2023-11-06

## 2023-11-06 PROBLEM — J30.9 ALLERGIC RHINITIS: Status: RESOLVED | Noted: 2023-07-25 | Resolved: 2023-11-06

## 2023-11-06 PROBLEM — M79.10 MYALGIA: Status: RESOLVED | Noted: 2023-07-25 | Resolved: 2023-11-06

## 2023-11-06 PROBLEM — R19.5 POSITIVE COLORECTAL CANCER SCREENING USING COLOGUARD TEST: Status: RESOLVED | Noted: 2023-07-25 | Resolved: 2023-11-06

## 2023-11-06 PROBLEM — M54.16 LUMBAR NEURITIS: Status: RESOLVED | Noted: 2023-07-25 | Resolved: 2023-11-06

## 2023-11-06 PROBLEM — N76.0 VULVOVAGINITIS: Status: RESOLVED | Noted: 2023-08-22 | Resolved: 2023-11-06

## 2023-11-07 ENCOUNTER — OFFICE VISIT (OUTPATIENT)
Dept: PRIMARY CARE | Facility: CLINIC | Age: 68
End: 2023-11-07
Payer: MEDICARE

## 2023-11-07 ENCOUNTER — LAB (OUTPATIENT)
Dept: LAB | Facility: LAB | Age: 68
End: 2023-11-07
Payer: MEDICARE

## 2023-11-07 VITALS — WEIGHT: 221 LBS | DIASTOLIC BLOOD PRESSURE: 86 MMHG | BODY MASS INDEX: 43.16 KG/M2 | SYSTOLIC BLOOD PRESSURE: 146 MMHG

## 2023-11-07 DIAGNOSIS — Z51.81 THERAPEUTIC DRUG MONITORING: ICD-10-CM

## 2023-11-07 DIAGNOSIS — R30.0 DYSURIA: Primary | ICD-10-CM

## 2023-11-07 DIAGNOSIS — R30.0 DYSURIA: ICD-10-CM

## 2023-11-07 DIAGNOSIS — M54.50 LUMBAR BACK PAIN: ICD-10-CM

## 2023-11-07 LAB
AMPHETAMINES UR QL SCN: NORMAL
APPEARANCE UR: ABNORMAL
BARBITURATES UR QL SCN: NORMAL
BILIRUB UR STRIP.AUTO-MCNC: NEGATIVE MG/DL
BZE UR QL SCN: NORMAL
CANNABINOIDS UR QL SCN: NORMAL
COLOR UR: ABNORMAL
CREAT UR-MCNC: 172.2 MG/DL (ref 20–320)
GLUCOSE UR STRIP.AUTO-MCNC: NEGATIVE MG/DL
KETONES UR STRIP.AUTO-MCNC: NEGATIVE MG/DL
LEUKOCYTE ESTERASE UR QL STRIP.AUTO: NEGATIVE
NITRITE UR QL STRIP.AUTO: NEGATIVE
PCP UR QL SCN: NORMAL
PH UR STRIP.AUTO: 5 [PH]
PROT UR STRIP.AUTO-MCNC: NEGATIVE MG/DL
RBC # UR STRIP.AUTO: NEGATIVE /UL
SP GR UR STRIP.AUTO: 1.02
UROBILINOGEN UR STRIP.AUTO-MCNC: <2 MG/DL

## 2023-11-07 PROCEDURE — 82570 ASSAY OF URINE CREATININE: CPT

## 2023-11-07 PROCEDURE — 80307 DRUG TEST PRSMV CHEM ANLYZR: CPT

## 2023-11-07 PROCEDURE — 99213 OFFICE O/P EST LOW 20 MIN: CPT | Performed by: STUDENT IN AN ORGANIZED HEALTH CARE EDUCATION/TRAINING PROGRAM

## 2023-11-07 PROCEDURE — 87086 URINE CULTURE/COLONY COUNT: CPT

## 2023-11-07 PROCEDURE — 1159F MED LIST DOCD IN RCRD: CPT | Performed by: STUDENT IN AN ORGANIZED HEALTH CARE EDUCATION/TRAINING PROGRAM

## 2023-11-07 PROCEDURE — 3079F DIAST BP 80-89 MM HG: CPT | Performed by: STUDENT IN AN ORGANIZED HEALTH CARE EDUCATION/TRAINING PROGRAM

## 2023-11-07 PROCEDURE — 80361 OPIATES 1 OR MORE: CPT

## 2023-11-07 PROCEDURE — 80365 DRUG SCREENING OXYCODONE: CPT

## 2023-11-07 PROCEDURE — 80346 BENZODIAZEPINES1-12: CPT

## 2023-11-07 PROCEDURE — 81003 URINALYSIS AUTO W/O SCOPE: CPT

## 2023-11-07 PROCEDURE — 80354 DRUG SCREENING FENTANYL: CPT

## 2023-11-07 PROCEDURE — 3077F SYST BP >= 140 MM HG: CPT | Performed by: STUDENT IN AN ORGANIZED HEALTH CARE EDUCATION/TRAINING PROGRAM

## 2023-11-07 PROCEDURE — 1160F RVW MEDS BY RX/DR IN RCRD: CPT | Performed by: STUDENT IN AN ORGANIZED HEALTH CARE EDUCATION/TRAINING PROGRAM

## 2023-11-07 PROCEDURE — 1036F TOBACCO NON-USER: CPT | Performed by: STUDENT IN AN ORGANIZED HEALTH CARE EDUCATION/TRAINING PROGRAM

## 2023-11-07 PROCEDURE — 80358 DRUG SCREENING METHADONE: CPT

## 2023-11-07 PROCEDURE — 80368 SEDATIVE HYPNOTICS: CPT

## 2023-11-07 PROCEDURE — 80373 DRUG SCREENING TRAMADOL: CPT

## 2023-11-07 PROCEDURE — 1125F AMNT PAIN NOTED PAIN PRSNT: CPT | Performed by: STUDENT IN AN ORGANIZED HEALTH CARE EDUCATION/TRAINING PROGRAM

## 2023-11-07 RX ORDER — OXYCODONE HYDROCHLORIDE 5 MG/1
5 TABLET ORAL EVERY 6 HOURS PRN
Qty: 120 TABLET | Refills: 0 | Status: SHIPPED | OUTPATIENT
Start: 2023-11-07 | End: 2023-12-07

## 2023-11-07 NOTE — PROGRESS NOTES
Subjective   Fadumo Lassiter is a 68 y.o. female who presents for Follow-up.    HPI  Patient presents the office today for routine follow-up.  States that overall she has had increased restless legs when she self decreased her opioid amount to 7.5 mg every 6 hours.  However, this is slowly improving.  Is encouraged to continue decreasing opioid amount.    Review of Systems  10 point review of system was performed and negative except as stated above.    OBJECTIVE  Physical Exam:  General:  Pleasant and cooperative. No apparent distress.  HEENT:  Normocephalic, atraumatic, mucus membranes moist.   Neck:  Trachea midline.  No JVD.    Chest:  Clear to auscultation bilaterally. No wheezes, rales, or rhonchi.  CV:  Regular rate and rhythm.  Positive S1/S2.   Abdomen: Bowel sounds present in all four quadrants, abdomen is soft, non-tender, non-distended.  Extremities:  No lower extremity edema or cyanosis.   Neurological:  AAOx3. No focal deficits.  Skin:  Warm and dry.    ASSESSMENT & PLAN  Chronic low back pain  History of multiple spinal surgeries  -Follows with spine surgery  -Had been on OxyContin in the past and was able to wean off  -Discussed goal to wean off opiate pain medications within 6 months. Decreased from 15 TID to 7.5 QID. Discussed decreasing further to 5 mg QID in the next several weeks. Goal of 20% reduction per month  -Did note increase in restless legs when medication was decreased in the past, discussed ropinirole, would like to hold off for now  -Had been on gabapentin and lyrica in the past that were stopped for side effects  -Positive WHITNEY, has seen rheumatology with reassuring workup, considering starting silvina chi  -Not currently interested in injections at this time to follow-up with pain management   -Takes naproxen sparingly, discussed option of other NSAIDs (meloxicam or diclofenac), would like to continue with naproxen as needed for now   -OAARS reviewed, urine screen ordered       Hypertension  -Continue lisinopril 5 mg daily  -Continue atenolol 25 mg daily (takes 1/2 50mg atenolol), may consider increasing back to 50mg in the future       Prior episode of dysuria  -Ordered UA/culture for if recurs again  -Discussed cranberry and D mannose supplements      Hypothyroidism  -Continue levothyroxine 88 mcg daily     GERD  -Continue esomeprazole 40 mg daily     Health maintenance  -Mammogram previously ordered  -Colonoscopy 2022, follow in 10 years  -Received flu and COVID boosters     RTC 1 month

## 2023-11-08 LAB — BACTERIA UR CULT: ABNORMAL

## 2023-11-13 LAB

## 2023-12-04 PROBLEM — R21 RASH: Status: ACTIVE | Noted: 2023-10-19

## 2023-12-04 PROBLEM — N89.8 VAGINAL ODOR: Status: ACTIVE | Noted: 2023-10-19

## 2023-12-04 RX ORDER — ESTRADIOL 0.1 MG/G
CREAM VAGINAL
COMMUNITY
Start: 2023-10-25 | End: 2023-12-18 | Stop reason: WASHOUT

## 2023-12-04 RX ORDER — NYSTATIN 100000 U/G
CREAM TOPICAL
COMMUNITY
Start: 2023-10-19 | End: 2024-01-16 | Stop reason: ALTCHOICE

## 2023-12-04 RX ORDER — METHOCARBAMOL 500 MG/1
TABLET, FILM COATED ORAL
COMMUNITY
Start: 2023-10-19 | End: 2024-01-16 | Stop reason: ALTCHOICE

## 2023-12-05 ENCOUNTER — OFFICE VISIT (OUTPATIENT)
Dept: PRIMARY CARE | Facility: CLINIC | Age: 68
End: 2023-12-05
Payer: MEDICARE

## 2023-12-05 VITALS — BODY MASS INDEX: 43.16 KG/M2 | DIASTOLIC BLOOD PRESSURE: 92 MMHG | WEIGHT: 221 LBS | SYSTOLIC BLOOD PRESSURE: 150 MMHG

## 2023-12-05 DIAGNOSIS — G25.81 RESTLESS LEG SYNDROME: ICD-10-CM

## 2023-12-05 DIAGNOSIS — M54.50 LUMBAR BACK PAIN: ICD-10-CM

## 2023-12-05 DIAGNOSIS — R30.0 DYSURIA: Primary | ICD-10-CM

## 2023-12-05 PROCEDURE — 99213 OFFICE O/P EST LOW 20 MIN: CPT | Performed by: STUDENT IN AN ORGANIZED HEALTH CARE EDUCATION/TRAINING PROGRAM

## 2023-12-05 PROCEDURE — 1160F RVW MEDS BY RX/DR IN RCRD: CPT | Performed by: STUDENT IN AN ORGANIZED HEALTH CARE EDUCATION/TRAINING PROGRAM

## 2023-12-05 PROCEDURE — 1125F AMNT PAIN NOTED PAIN PRSNT: CPT | Performed by: STUDENT IN AN ORGANIZED HEALTH CARE EDUCATION/TRAINING PROGRAM

## 2023-12-05 PROCEDURE — 1159F MED LIST DOCD IN RCRD: CPT | Performed by: STUDENT IN AN ORGANIZED HEALTH CARE EDUCATION/TRAINING PROGRAM

## 2023-12-05 PROCEDURE — 3080F DIAST BP >= 90 MM HG: CPT | Performed by: STUDENT IN AN ORGANIZED HEALTH CARE EDUCATION/TRAINING PROGRAM

## 2023-12-05 PROCEDURE — 3077F SYST BP >= 140 MM HG: CPT | Performed by: STUDENT IN AN ORGANIZED HEALTH CARE EDUCATION/TRAINING PROGRAM

## 2023-12-05 PROCEDURE — 1036F TOBACCO NON-USER: CPT | Performed by: STUDENT IN AN ORGANIZED HEALTH CARE EDUCATION/TRAINING PROGRAM

## 2023-12-05 NOTE — PROGRESS NOTES
Follow up    Subjective   Patient ID: Fadumo Lassiter is a 68 y.o. female who presents for Follow-up.    HPI     Presents for follow-up.  Has been down in [place] over Thanksgiving.  Restless legs has continue be more noticeable as dose of oxycodone has decreased.  Has remained at 7.5 mg 4 times a day.  Very motivated to continue to decrease use, neck step will be replacing 7.5 mg dosing with 5 mg dosing.  UTI symptoms have improved although urination has not completely returned back to normal yet    Review of Systems    8 point review of systems is otherwise negative unless mentioned on HPI      Objective   BP (!) 150/92   Wt 100 kg (221 lb)   BMI 43.16 kg/m²     Physical Exam    General: No acute distress  HEENT: EOMI  CV: Regular rate and rhythm, normal S1 and S2, no murmurs  Pulm: Clear to auscultation bilaterally, no wheezings, rales or rhonchi  Abd: Nondistended  Skin: No rashes   Lymphatic: No lymphadenopathy      Assessment/Plan       Chronic low back pain  History of multiple spinal surgeries  -Follows with spine surgery  -Had been on OxyContin in the past and was able to wean off  -Discussed goal to wean off opiate pain medications within 6 months. Decreased from 15 TID to 7.5 QID. Discussed decreasing further to 5 mg QID in the next several weeks. Goal of 20% reduction per month.  Will be planning to replace 7.5 mg tablet 5 mg tablet 1 time throughout the day and then continue to decrease other times of the day to 5 mg tablets.  -Did note increase in restless legs when medication was decreased in the past, discussed ropinirole, would like to hold off for now  -Had been on gabapentin and lyrica in the past that were stopped for side effects  -Positive WHITNEY, has seen rheumatology with reassuring workup, considering starting silvina chi  -Not currently interested in injections at this time to follow-up with pain management   -Takes naproxen sparingly, discussed option of other NSAIDs (meloxicam or  diclofenac), would like to continue with naproxen as needed for now   -OAARS reviewed, urine screen ordered      Hypertension  -Continue lisinopril 5 mg daily  -Continue atenolol 25 mg daily (takes 1/2 50mg atenolol), may consider increasing back to 50mg in the future       Prior episode of dysuria  -Ordered UA/culture for if recurs again  -Discussed cranberry and D mannose supplements      Hypothyroidism  -Continue levothyroxine 88 mcg daily     GERD  -Continue esomeprazole 40 mg daily     Health maintenance  -Mammogram previously ordered  -Colonoscopy 2022, follow in 10 years  -Received flu and COVID boosters     RTC 1 month    This note was dictated by speech recognition. Minor errors in transcription may be present.

## 2023-12-18 ENCOUNTER — TELEPHONE (OUTPATIENT)
Dept: PRIMARY CARE | Facility: CLINIC | Age: 68
End: 2023-12-18

## 2023-12-18 ENCOUNTER — TELEMEDICINE (OUTPATIENT)
Dept: PRIMARY CARE | Facility: CLINIC | Age: 68
End: 2023-12-18
Payer: MEDICARE

## 2023-12-18 VITALS — WEIGHT: 218 LBS | BODY MASS INDEX: 42.58 KG/M2 | TEMPERATURE: 100 F

## 2023-12-18 DIAGNOSIS — U07.1 COVID-19: Primary | ICD-10-CM

## 2023-12-18 DIAGNOSIS — U07.1 COVID-19: ICD-10-CM

## 2023-12-18 PROCEDURE — 99213 OFFICE O/P EST LOW 20 MIN: CPT | Performed by: STUDENT IN AN ORGANIZED HEALTH CARE EDUCATION/TRAINING PROGRAM

## 2023-12-18 RX ORDER — AZITHROMYCIN 250 MG/1
TABLET, FILM COATED ORAL
Qty: 6 TABLET | Refills: 0 | Status: SHIPPED | OUTPATIENT
Start: 2023-12-18 | End: 2023-12-23

## 2023-12-18 RX ORDER — NIRMATRELVIR AND RITONAVIR 300-100 MG
3 KIT ORAL 2 TIMES DAILY
Qty: 5 DOSE PACK | Refills: 0 | Status: SHIPPED | OUTPATIENT
Start: 2023-12-18 | End: 2023-12-18 | Stop reason: SDUPTHER

## 2023-12-18 RX ORDER — GUAIFENESIN 600 MG/1
1200 TABLET, EXTENDED RELEASE ORAL 2 TIMES DAILY
Qty: 120 TABLET | Refills: 11 | Status: SHIPPED | OUTPATIENT
Start: 2023-12-18 | End: 2024-01-16 | Stop reason: ALTCHOICE

## 2023-12-18 RX ORDER — NIRMATRELVIR AND RITONAVIR 300-100 MG
3 KIT ORAL 2 TIMES DAILY
Qty: 30 TABLET | Refills: 0 | Status: SHIPPED | OUTPATIENT
Start: 2023-12-18 | End: 2023-12-23

## 2023-12-18 RX ORDER — FLUTICASONE PROPIONATE 50 MCG
1 SPRAY, SUSPENSION (ML) NASAL DAILY
Qty: 16 G | Refills: 5 | Status: SHIPPED | OUTPATIENT
Start: 2023-12-18 | End: 2024-12-17

## 2023-12-18 RX ORDER — BENZONATATE 100 MG/1
100 CAPSULE ORAL 3 TIMES DAILY PRN
Qty: 42 CAPSULE | Refills: 0 | Status: SHIPPED | OUTPATIENT
Start: 2023-12-18 | End: 2024-01-16 | Stop reason: ALTCHOICE

## 2023-12-18 ASSESSMENT — ENCOUNTER SYMPTOMS
PSYCHIATRIC NEGATIVE: 1
FACIAL SWELLING: 1
RESPIRATORY NEGATIVE: 1
GASTROINTESTINAL NEGATIVE: 1
MUSCULOSKELETAL NEGATIVE: 1
CONSTITUTIONAL NEGATIVE: 1
NEUROLOGICAL NEGATIVE: 1
SINUS PRESSURE: 1
CARDIOVASCULAR NEGATIVE: 1
SINUS PAIN: 1

## 2023-12-18 NOTE — PROGRESS NOTES
Tested positive for covid yesterday    Subjective   Patient ID: Fadumo Lassiter is a 68 y.o. female.    Pt recently tested positive for covid 19. She tested herself on Sunday and symptoms started on Thursday. She states that she has had a low-grade fever over the past couple days that does break with anti-inflammatories.  Otherwise, states that she is having some cough and congestion as well.  Denies any additional issues at this time.        Review of Systems   Constitutional: Negative.    HENT:  Positive for congestion, facial swelling, postnasal drip, sinus pressure and sinus pain.    Respiratory: Negative.     Cardiovascular: Negative.    Gastrointestinal: Negative.    Musculoskeletal: Negative.    Neurological: Negative.    Psychiatric/Behavioral: Negative.         Objective   Physical Exam and vitals deferred due to virtual evaluation    Assessment/Plan   Diagnoses and all orders for this visit:  COVID-19  -     nirmatrelvir-ritonavir (Paxlovid) 300 mg (150 mg x 2)-100 mg tablet therapy pack; Take 3 tablets by mouth 2 times a day for 5 days. Take as directed per pack  -     benzonatate (Tessalon) 100 mg capsule; Take 1 capsule (100 mg) by mouth 3 times a day as needed for cough. Do not crush or chew.  -     guaiFENesin (Mucinex) 600 mg 12 hr tablet; Take 2 tablets (1,200 mg) by mouth 2 times a day. Do not crush, chew, or split.  -     fluticasone (Flonase) 50 mcg/actuation nasal spray; Administer 1 spray into each nostril once daily. Shake gently. Before first use, prime pump. After use, clean tip and replace cap.  -     azithromycin (Zithromax) 250 mg tablet; Take 2 tablets (500 mg) by mouth once daily for 1 day, THEN 1 tablet (250 mg) once daily for 4 days. Take 2 tabs (500 mg) by mouth today, than 1 daily for 4 days..    Patient seen on virtual evaluation    #COVID-19  #Upper respiratory infection  Patient recently tested positive for COVID-19, recommend course of Paxlovid as she is within the window,  Tessalon Perles, Mucinex and Flonase for symptomatic control.  Recommend course of azithromycin if she is still symptomatic by day 910.  She will call if symptoms worsen or do not improve she is agreeable with this plan.    Return to care as previous scheduled or as needed

## 2024-01-16 ENCOUNTER — OFFICE VISIT (OUTPATIENT)
Dept: PRIMARY CARE | Facility: CLINIC | Age: 69
End: 2024-01-16
Payer: MEDICARE

## 2024-01-16 VITALS — BODY MASS INDEX: 43.36 KG/M2 | DIASTOLIC BLOOD PRESSURE: 90 MMHG | SYSTOLIC BLOOD PRESSURE: 154 MMHG | WEIGHT: 222 LBS

## 2024-01-16 DIAGNOSIS — I10 HYPERTENSION, UNSPECIFIED TYPE: Primary | ICD-10-CM

## 2024-01-16 DIAGNOSIS — Z00.00 ROUTINE GENERAL MEDICAL EXAMINATION AT HEALTH CARE FACILITY: Primary | ICD-10-CM

## 2024-01-16 DIAGNOSIS — M54.50 LUMBAR BACK PAIN: ICD-10-CM

## 2024-01-16 DIAGNOSIS — G25.81 RESTLESS LEG SYNDROME: ICD-10-CM

## 2024-01-16 PROCEDURE — 3080F DIAST BP >= 90 MM HG: CPT | Performed by: STUDENT IN AN ORGANIZED HEALTH CARE EDUCATION/TRAINING PROGRAM

## 2024-01-16 PROCEDURE — 1036F TOBACCO NON-USER: CPT | Performed by: STUDENT IN AN ORGANIZED HEALTH CARE EDUCATION/TRAINING PROGRAM

## 2024-01-16 PROCEDURE — 1125F AMNT PAIN NOTED PAIN PRSNT: CPT | Performed by: STUDENT IN AN ORGANIZED HEALTH CARE EDUCATION/TRAINING PROGRAM

## 2024-01-16 PROCEDURE — 1170F FXNL STATUS ASSESSED: CPT | Performed by: STUDENT IN AN ORGANIZED HEALTH CARE EDUCATION/TRAINING PROGRAM

## 2024-01-16 PROCEDURE — 1159F MED LIST DOCD IN RCRD: CPT | Performed by: STUDENT IN AN ORGANIZED HEALTH CARE EDUCATION/TRAINING PROGRAM

## 2024-01-16 PROCEDURE — 99213 OFFICE O/P EST LOW 20 MIN: CPT | Performed by: STUDENT IN AN ORGANIZED HEALTH CARE EDUCATION/TRAINING PROGRAM

## 2024-01-16 PROCEDURE — 1160F RVW MEDS BY RX/DR IN RCRD: CPT | Performed by: STUDENT IN AN ORGANIZED HEALTH CARE EDUCATION/TRAINING PROGRAM

## 2024-01-16 PROCEDURE — G0439 PPPS, SUBSEQ VISIT: HCPCS | Performed by: STUDENT IN AN ORGANIZED HEALTH CARE EDUCATION/TRAINING PROGRAM

## 2024-01-16 PROCEDURE — 3008F BODY MASS INDEX DOCD: CPT | Performed by: STUDENT IN AN ORGANIZED HEALTH CARE EDUCATION/TRAINING PROGRAM

## 2024-01-16 PROCEDURE — 3077F SYST BP >= 140 MM HG: CPT | Performed by: STUDENT IN AN ORGANIZED HEALTH CARE EDUCATION/TRAINING PROGRAM

## 2024-01-16 RX ORDER — OXYCODONE HYDROCHLORIDE 5 MG/1
TABLET ORAL
COMMUNITY
End: 2024-01-16 | Stop reason: SDUPTHER

## 2024-01-16 RX ORDER — OXYCODONE HYDROCHLORIDE 5 MG/1
5 TABLET ORAL EVERY 4 HOURS PRN
Qty: 120 TABLET | Refills: 0 | Status: SHIPPED | OUTPATIENT
Start: 2024-01-16 | End: 2024-02-13 | Stop reason: SDUPTHER

## 2024-01-16 RX ORDER — ATENOLOL 50 MG/1
25 TABLET ORAL DAILY
Qty: 45 TABLET | Refills: 1 | Status: SHIPPED | OUTPATIENT
Start: 2024-01-16 | End: 2024-07-14

## 2024-01-16 ASSESSMENT — PATIENT HEALTH QUESTIONNAIRE - PHQ9
2. FEELING DOWN, DEPRESSED OR HOPELESS: NOT AT ALL
1. LITTLE INTEREST OR PLEASURE IN DOING THINGS: NOT AT ALL
SUM OF ALL RESPONSES TO PHQ9 QUESTIONS 1 AND 2: 0

## 2024-01-16 ASSESSMENT — ACTIVITIES OF DAILY LIVING (ADL)
GROCERY_SHOPPING: INDEPENDENT
BATHING: INDEPENDENT
DRESSING: INDEPENDENT
MANAGING_FINANCES: INDEPENDENT
DOING_HOUSEWORK: INDEPENDENT
TAKING_MEDICATION: INDEPENDENT

## 2024-01-16 NOTE — PROGRESS NOTES
Follow up and med refill and wellness visit    Subjective   Reason for Visit: Fadumo Lassiter is an 68 y.o. female here for a Medicare Wellness visit.          Reviewed all medications by prescribing practitioner or clinical pharmacist (such as prescriptions, OTCs, herbal therapies and supplements) and documented in the medical record.    HPI    Patient Care Team:  Jim Sumner MD as PCP - General (Internal Medicine)     Review of Systems    Objective   Vitals:  /90   Wt 101 kg (222 lb)   BMI 43.36 kg/m²       Physical Exam    Assessment/Plan   Problem List Items Addressed This Visit    None

## 2024-01-16 NOTE — PROGRESS NOTES
Subjective   Reason for Visit: Fadumo Lassiter is an 68 y.o. female here for a Medicare Wellness visit.          Reviewed all medications by prescribing practitioner or clinical pharmacist (such as prescriptions, OTCs, herbal therapies and supplements) and documented in the medical record.    HPI    Patient Care Team:  Jim Sumner MD as PCP - General (Internal Medicine)     Review of Systems    Objective   Vitals:  /90   Wt 101 kg (222 lb)   BMI 43.36 kg/m²       Physical Exam    Assessment/Plan   Problem List Items Addressed This Visit    None  Visit Diagnoses     Routine general medical examination at [facility]    -  Primary

## 2024-01-16 NOTE — PROGRESS NOTES
Subjective   Patient ID: Fadumo Lassiter is a 68 y.o. female who presents for Follow-up, Med Refill, and Medicare Annual Wellness Visit Subsequent.  HPI  Pt is here for follow up and med refill. She has started working out again and feels much better. She reports her restless legs and muscle spasms have been more noticeable while sitting and driving but would still like to hold off starting any new medications for now. She had COVID recently and had a wheeze at night associated which has improved now. She has weaned her oxycodone use to 5mg BID and 7.5mg BID. Denies any other complaints.     Review of Systems  12-point ROS was reviewed and is negative, unless otherwise noted in HPI    Objective   Vitals:    01/16/24 1101   BP: 154/90      Physical Exam  GEN: alert, conversant, NAD  HEENT: PERRL, EOMI, MMM  NECK: supple, no LAD appreciated  CHEST: CTAB  CV: S1, S2, RRR, no murmurs appreciated  ABD: soft, NT, ND  EXT: no significant LE edema  SKIN: warm, dry    Assessment/Plan   Chronic low back pain  History of multiple spinal surgeries  -Follows with spine surgery  -Had been on OxyContin in the past and was able to wean off  -Discussed goal to wean off opiate pain medications within 6 months. Decreased from 15 TID to 7.5 QID. Discussed decreasing further to 5 mg QID in the next several weeks. Goal of 20% reduction per month.  Will be planning to replace 7.5 mg tablet 5 mg tablet 1 time throughout the day and then continue to decrease other times of the day to 5 mg tablets. Currently reduced 2 doses to 5mg daily and 2 doses of 7.5mg daily.  -Did note increase in restless legs when medication was decreased in the past, discussed ropinirole, would like to hold off for now  -Had been on gabapentin and lyrica in the past that were stopped for side effects  -Positive WHITNEY, has seen rheumatology with reassuring workup, considering starting silvina chi  -Not currently interested in injections at this time to follow-up with pain  management   -Takes naproxen sparingly, discussed option of other NSAIDs (meloxicam or diclofenac), would like to continue with naproxen as needed for now   -OAARS reviewed, urine screen ordered      Hypertension  -Continue lisinopril 5 mg daily  -Continue atenolol 25 mg daily (takes 1/2 50mg atenolol), may consider increasing back to 50mg in the future       Prior episode of dysuria  -Ordered UA/culture for if recurs again  -Discussed cranberry and D mannose supplements      Hypothyroidism  -Continue levothyroxine 88 mcg daily     GERD  -Continue esomeprazole 40 mg daily     Health maintenance  -Mammogram previously ordered  -Colonoscopy 2022, follow in 10 years  -Received flu and COVID boosters     RTC 1 month    This note was dictated by speech recognition. Minor errors in transcription may be present.     Alexey Hopkins, DO

## 2024-01-25 DIAGNOSIS — E03.9 HYPOTHYROIDISM, UNSPECIFIED TYPE: Primary | ICD-10-CM

## 2024-01-25 RX ORDER — LEVOTHYROXINE SODIUM 88 UG/1
TABLET ORAL
Qty: 30 TABLET | Refills: 0 | Status: SHIPPED | OUTPATIENT
Start: 2024-01-25 | End: 2024-02-13 | Stop reason: SDUPTHER

## 2024-02-13 ENCOUNTER — OFFICE VISIT (OUTPATIENT)
Dept: PRIMARY CARE | Facility: CLINIC | Age: 69
End: 2024-02-13
Payer: MEDICARE

## 2024-02-13 VITALS — DIASTOLIC BLOOD PRESSURE: 88 MMHG | SYSTOLIC BLOOD PRESSURE: 142 MMHG

## 2024-02-13 DIAGNOSIS — E03.9 HYPOTHYROIDISM, UNSPECIFIED TYPE: ICD-10-CM

## 2024-02-13 DIAGNOSIS — M54.50 LUMBAR BACK PAIN: ICD-10-CM

## 2024-02-13 PROCEDURE — 99213 OFFICE O/P EST LOW 20 MIN: CPT | Performed by: STUDENT IN AN ORGANIZED HEALTH CARE EDUCATION/TRAINING PROGRAM

## 2024-02-13 PROCEDURE — 3077F SYST BP >= 140 MM HG: CPT | Performed by: STUDENT IN AN ORGANIZED HEALTH CARE EDUCATION/TRAINING PROGRAM

## 2024-02-13 PROCEDURE — 1159F MED LIST DOCD IN RCRD: CPT | Performed by: STUDENT IN AN ORGANIZED HEALTH CARE EDUCATION/TRAINING PROGRAM

## 2024-02-13 PROCEDURE — 1036F TOBACCO NON-USER: CPT | Performed by: STUDENT IN AN ORGANIZED HEALTH CARE EDUCATION/TRAINING PROGRAM

## 2024-02-13 PROCEDURE — 3079F DIAST BP 80-89 MM HG: CPT | Performed by: STUDENT IN AN ORGANIZED HEALTH CARE EDUCATION/TRAINING PROGRAM

## 2024-02-13 PROCEDURE — 1125F AMNT PAIN NOTED PAIN PRSNT: CPT | Performed by: STUDENT IN AN ORGANIZED HEALTH CARE EDUCATION/TRAINING PROGRAM

## 2024-02-13 PROCEDURE — 3008F BODY MASS INDEX DOCD: CPT | Performed by: STUDENT IN AN ORGANIZED HEALTH CARE EDUCATION/TRAINING PROGRAM

## 2024-02-13 RX ORDER — LEVOTHYROXINE SODIUM 88 UG/1
TABLET ORAL
Qty: 90 TABLET | Refills: 1 | Status: SHIPPED | OUTPATIENT
Start: 2024-02-13

## 2024-02-13 RX ORDER — OXYCODONE HYDROCHLORIDE 5 MG/1
5 TABLET ORAL
Qty: 150 TABLET | Refills: 0 | Status: SHIPPED | OUTPATIENT
Start: 2024-02-13 | End: 2024-03-11 | Stop reason: SDUPTHER

## 2024-02-13 NOTE — PROGRESS NOTES
Subjective   Patient ID: Fadumo Lassiter is a 68 y.o. female who presents for Follow-up (Med refill).    HPI     Presents for follow-up.  Recently had a dental visit and was found to have a tooth infection, has been placed on amoxicillin, had been told that the tooth will need to be removed, timing has not been set up for this yet.  With having history of joint infection, had been told by orthopedist, that needs to take prophylactic antibiotics prior to dental procedures.  Questions if the antibiotic will need to be continued until the tooth is ultimately removed.  Takes an over-the-counter probiotic.  Does have some heartburn associated with antibiotic as well.  Restless leg at night had been worsened by COVID, just recently had started to have an improvement    Review of Systems    8 point review of systems is otherwise negative unless mentioned on HPI      Objective   There were no vitals taken for this visit.    Physical Exam    General: No acute distress  HEENT: EOMI  Abd: Nondistended  MSK: 5/5 strength in all extremities  Skin: No rashes   Lymphatic: No lymphadenopathy      Assessment/Plan       Chronic low back pain  History of multiple spinal surgeries  -Follows with spine surgery  -Had been on OxyContin in the past and was able to wean off  -Discussed goal to continue to wean opiate pain medications with goal of discontinuing completely  -At the beginning was on oxycodone 15 mg 3 times daily  -Currently on oxycodone 5 mg twice a day and 7.5 mg twice a day.  Usually the 7.5 mg dosing is more been in the evening time  -Restless leg syndrome has been increased as oxycodone is decreased, in the past had discussed ropinirole, opted to hold off for now  -Had been on gabapentin and lyrica in the past that were stopped for side effects  -Positive WHITNEY, has seen rheumatology with reassuring workup, considering starting silvina chi  -Not currently interested in injections at this time to follow-up with pain management    -Takes naproxen sparingly, discussed option of other NSAIDs (meloxicam or diclofenac), would like to continue with naproxen as needed for now   -OAARS reviewed, urine screen 11/2023      Hypertension  -Continue lisinopril 5 mg daily  -Continue atenolol 25 mg daily (takes 1/2 50mg atenolol), may consider increasing back to 50mg in the future      Dental infection  -Currently on course of amoxicillin, waiting to see about timing of removal of the tooth.  Due to history of orthopedic joint replacement, had been recommended to take prophylactic antibiotics prior to dental procedures.  May need to stay on the antibiotic until the tooth is ultimately removed.  Continue probiotics, discussed addition of high probiotic yogurt     Prior episode of dysuria  -Ordered UA/culture for if recurs again  -Discussed cranberry and D mannose supplements      Hypothyroidism  -Continue levothyroxine 88 mcg daily     GERD  -Continue esomeprazole 40 mg daily     Health maintenance  -Mammogram previously ordered  -Colonoscopy 2022, follow in 10 years  -Received flu and COVID boosters     RTC 1 month     This note was dictated by speech recognition. Minor errors in transcription may be present.

## 2024-02-20 ENCOUNTER — APPOINTMENT (OUTPATIENT)
Dept: PRIMARY CARE | Facility: CLINIC | Age: 69
End: 2024-02-20
Payer: MEDICARE

## 2024-02-27 DIAGNOSIS — I10 HYPERTENSION, UNSPECIFIED TYPE: ICD-10-CM

## 2024-02-27 RX ORDER — LISINOPRIL 5 MG/1
5 TABLET ORAL DAILY
Qty: 90 TABLET | Refills: 1 | Status: SHIPPED | OUTPATIENT
Start: 2024-02-27

## 2024-03-05 ENCOUNTER — OFFICE VISIT (OUTPATIENT)
Dept: PRIMARY CARE | Facility: CLINIC | Age: 69
End: 2024-03-05
Payer: COMMERCIAL

## 2024-03-05 VITALS — DIASTOLIC BLOOD PRESSURE: 80 MMHG | BODY MASS INDEX: 43.36 KG/M2 | WEIGHT: 222 LBS | SYSTOLIC BLOOD PRESSURE: 140 MMHG

## 2024-03-05 DIAGNOSIS — M54.50 LUMBAR BACK PAIN: ICD-10-CM

## 2024-03-05 DIAGNOSIS — K04.7 TOOTH INFECTION: Primary | ICD-10-CM

## 2024-03-05 PROCEDURE — 1125F AMNT PAIN NOTED PAIN PRSNT: CPT | Performed by: STUDENT IN AN ORGANIZED HEALTH CARE EDUCATION/TRAINING PROGRAM

## 2024-03-05 PROCEDURE — 3077F SYST BP >= 140 MM HG: CPT | Performed by: STUDENT IN AN ORGANIZED HEALTH CARE EDUCATION/TRAINING PROGRAM

## 2024-03-05 PROCEDURE — 3008F BODY MASS INDEX DOCD: CPT | Performed by: STUDENT IN AN ORGANIZED HEALTH CARE EDUCATION/TRAINING PROGRAM

## 2024-03-05 PROCEDURE — 1159F MED LIST DOCD IN RCRD: CPT | Performed by: STUDENT IN AN ORGANIZED HEALTH CARE EDUCATION/TRAINING PROGRAM

## 2024-03-05 PROCEDURE — 1036F TOBACCO NON-USER: CPT | Performed by: STUDENT IN AN ORGANIZED HEALTH CARE EDUCATION/TRAINING PROGRAM

## 2024-03-05 PROCEDURE — 3079F DIAST BP 80-89 MM HG: CPT | Performed by: STUDENT IN AN ORGANIZED HEALTH CARE EDUCATION/TRAINING PROGRAM

## 2024-03-05 PROCEDURE — 99213 OFFICE O/P EST LOW 20 MIN: CPT | Performed by: STUDENT IN AN ORGANIZED HEALTH CARE EDUCATION/TRAINING PROGRAM

## 2024-03-05 RX ORDER — AMOXICILLIN AND CLAVULANATE POTASSIUM 875; 125 MG/1; MG/1
875 TABLET, FILM COATED ORAL 2 TIMES DAILY
Qty: 20 TABLET | Refills: 0 | Status: SHIPPED | OUTPATIENT
Start: 2024-03-05 | End: 2024-03-15

## 2024-03-05 NOTE — PROGRESS NOTES
Follow up and tooth infection    Subjective   Patient ID: Fadumo Lassiter is a 68 y.o. female who presents for Follow-up and tooth infection.    HPI     Presents for follow-up.  Has been following with dentist about ongoing tooth pain, had CT imaging that showed that there was an infection of the tooth.  Has had different opinions about root canal versus extraction and implant.    Oxycodone has been decreased to 5 mg 4 times per day. Pain has flared but feels more like herself without the medication    Review of Systems    8 point review of systems is otherwise negative unless mentioned on HPI      Objective   /80   Wt 101 kg (222 lb)   BMI 43.36 kg/m²     Physical Exam    General: No acute distress  HEENT: EOMI  Abd: Nondistended  MSK: 5/5 strength in all extremities  Skin: No rashes   Lymphatic: No lymphadenopathy       Assessment/Plan       Chronic low back pain  History of multiple spinal surgeries  -Follows with spine surgery  -Had been on OxyContin in the past and was able to wean off  -Discussed goal to continue to wean opiate pain medications with goal of discontinuing completely  -At the beginning was on oxycodone 15 mg 3 times daily  -Currently on oxycodone 5 mg 4 times a day  -Restless leg syndrome has been increased as oxycodone is decreased, in the past had discussed ropinirole, opted to hold off for now  -Had been on gabapentin and lyrica in the past that were stopped for side effects  -Positive WHITNEY, has seen rheumatology with reassuring workup, considering starting silvina chi  -Not currently interested in injections at this time to follow-up with pain management   -Takes naproxen sparingly, discussed option of other NSAIDs (meloxicam or diclofenac), would like to continue with naproxen as needed for now   -OAARS reviewed, urine screen 11/2023      Hypertension  -Continue lisinopril 5 mg daily  -Continue atenolol 25 mg daily (takes 1/2 50mg atenolol), may consider increasing back to 50mg in the  future       Dental infection  -Will prescribe Augmentin while waiting for further plans for tooth  -Due to history of orthopedic joint replacement, had been recommended to take prophylactic antibiotics prior to dental procedures.  May need to stay on the antibiotic until the tooth is ultimately removed.  Continue probiotics, discussed addition of high probiotic yogurt     Prior episode of dysuria  -Ordered UA/culture for if recurs again  -Discussed cranberry and D mannose supplements      Hypothyroidism  -Continue levothyroxine 88 mcg daily     GERD  -Continue esomeprazole 40 mg daily     Health maintenance  -Mammogram previously ordered  -Colonoscopy 2022, follow in 10 years  -Received flu and COVID boosters     RTC 1 month     This note was dictated by speech recognition. Minor errors in transcription may be present.

## 2024-03-11 DIAGNOSIS — M54.50 LUMBAR BACK PAIN: ICD-10-CM

## 2024-03-11 RX ORDER — OXYCODONE HYDROCHLORIDE 5 MG/1
5 TABLET ORAL
Qty: 150 TABLET | Refills: 0 | Status: SHIPPED | OUTPATIENT
Start: 2024-03-11 | End: 2024-04-10 | Stop reason: SDUPTHER

## 2024-03-13 ENCOUNTER — TELEPHONE (OUTPATIENT)
Dept: PRIMARY CARE | Facility: CLINIC | Age: 69
End: 2024-03-13
Payer: COMMERCIAL

## 2024-03-13 DIAGNOSIS — K04.7 TOOTH INFECTION: Primary | ICD-10-CM

## 2024-03-13 RX ORDER — LOPERAMIDE HCL 2 MG
2 TABLET ORAL 4 TIMES DAILY PRN
Qty: 30 TABLET | Refills: 0 | Status: SHIPPED | OUTPATIENT
Start: 2024-03-13 | End: 2024-03-23

## 2024-03-13 RX ORDER — AMOXICILLIN 500 MG/1
CAPSULE ORAL
Qty: 10 CAPSULE | Refills: 0 | Status: SHIPPED | OUTPATIENT
Start: 2024-03-13

## 2024-03-13 NOTE — TELEPHONE ENCOUNTER
Follow up: dentist says should stay on antibiotic through tomorrow's procedure. Am requesting another type that is less offensive to stomach, possibly amoxicillin. And is requesting something for the diarrhea that she has now, preferably loperamide.

## 2024-04-05 ENCOUNTER — OFFICE VISIT (OUTPATIENT)
Dept: PRIMARY CARE | Facility: CLINIC | Age: 69
End: 2024-04-05
Payer: COMMERCIAL

## 2024-04-05 VITALS — SYSTOLIC BLOOD PRESSURE: 152 MMHG | BODY MASS INDEX: 44.53 KG/M2 | WEIGHT: 228 LBS | DIASTOLIC BLOOD PRESSURE: 90 MMHG

## 2024-04-05 DIAGNOSIS — M54.50 LUMBAR BACK PAIN: Primary | ICD-10-CM

## 2024-04-05 DIAGNOSIS — M19.90 OSTEOARTHRITIS, UNSPECIFIED OSTEOARTHRITIS TYPE, UNSPECIFIED SITE: ICD-10-CM

## 2024-04-05 PROCEDURE — 3008F BODY MASS INDEX DOCD: CPT | Performed by: STUDENT IN AN ORGANIZED HEALTH CARE EDUCATION/TRAINING PROGRAM

## 2024-04-05 PROCEDURE — 1159F MED LIST DOCD IN RCRD: CPT | Performed by: STUDENT IN AN ORGANIZED HEALTH CARE EDUCATION/TRAINING PROGRAM

## 2024-04-05 PROCEDURE — 3077F SYST BP >= 140 MM HG: CPT | Performed by: STUDENT IN AN ORGANIZED HEALTH CARE EDUCATION/TRAINING PROGRAM

## 2024-04-05 PROCEDURE — 99214 OFFICE O/P EST MOD 30 MIN: CPT | Performed by: STUDENT IN AN ORGANIZED HEALTH CARE EDUCATION/TRAINING PROGRAM

## 2024-04-05 PROCEDURE — 3080F DIAST BP >= 90 MM HG: CPT | Performed by: STUDENT IN AN ORGANIZED HEALTH CARE EDUCATION/TRAINING PROGRAM

## 2024-04-05 NOTE — PROGRESS NOTES
Follow up and concerned about weigtht gain    Subjective   Patient ID: Fadumo Lassiter is a 68 y.o. female who presents for Follow-up and concerned about weight gain.    HPI     Followup  Had a stressful month  Had tooth removal and will be scheduling for bridge  Slight left ear discomfort, when wearing ear buds when working out, can not hear quite as well from that side  Doing stair machine for up to 40 minutes  Noted weight gain over the month  Decreased morning oxycodone to 2.5mg    Review of Systems    8 point review of systems is otherwise negative unless mentioned on HPI      Objective   /90   Wt 103 kg (228 lb)   BMI 44.53 kg/m²     Physical Exam    General: No acute distress  HEENT: Moderate amount of cerumen left ear canal. After removal and re-evaluation, TM pearly without erythema   CV: Regular rate and rhythm, normal S1 and S2, no murmurs  Pulm: Clear to auscultation bilaterally, no wheezings, rales or rhonchi  Abd: Nondistended  MSK: 5/5 strength in all extremities  Skin: No rashes   Lymphatic: No lymphadenopathy      Assessment/Plan        Chronic low back pain  History of multiple spinal surgeries  -Follows with spine surgery  -Had been on OxyContin in the past and was able to wean off  -Discussed goal to continue to wean opiate pain medications with goal of discontinuing completely  -At the beginning was on oxycodone 15 mg 3 times daily  -Oxycodone 5 mg 4 times a day. Has decreased first dose of day to 2.5mg.  -Restless leg syndrome has been increased as oxycodone is decreased, in the past had discussed ropinirole, opted to hold off for now  -Had been on gabapentin and lyrica in the past that were stopped for side effects  -Positive WHITNEY, has seen rheumatology with reassuring workup, considering starting silvina chi  -Not currently interested in injections at this time to follow-up with pain management   -Takes naproxen sparingly, discussed option of other NSAIDs (meloxicam or diclofenac), would  like to continue with naproxen as needed for now   -OAARS reviewed, urine screen 11/2023      Hypertension  -Continue lisinopril 5 mg daily  -Continue atenolol 25 mg daily (takes 1/2 50mg atenolol), may consider increasing back to 50mg in the future       History of dental infection  -Was on Augmentin, tooth has since been removed, planned for bridge. Some intermittent times of diarrhea but then on other days will have more normal bowel movements  -Due to history of orthopedic joint replacement, had been recommended to take prophylactic antibiotics prior to dental procedures.       Prior episode of dysuria  -Ordered UA/culture for if recurs again  -Discussed cranberry and D mannose supplements      Hypothyroidism  -Continue levothyroxine 88 mcg daily     GERD  -Continue esomeprazole 40 mg daily     Health maintenance  -Mammogram previously ordered  -Colonoscopy 2022, follow in 10 years  -Received flu and COVID boosters     RTC 1 month     This note was dictated by speech recognition. Minor errors in transcription may be present.

## 2024-04-10 DIAGNOSIS — M54.50 LUMBAR BACK PAIN: ICD-10-CM

## 2024-04-10 RX ORDER — OXYCODONE HYDROCHLORIDE 5 MG/1
5 TABLET ORAL
Qty: 150 TABLET | Refills: 0 | Status: SHIPPED | OUTPATIENT
Start: 2024-04-10 | End: 2024-05-16 | Stop reason: SDUPTHER

## 2024-05-03 ENCOUNTER — OFFICE VISIT (OUTPATIENT)
Dept: PRIMARY CARE | Facility: CLINIC | Age: 69
End: 2024-05-03
Payer: COMMERCIAL

## 2024-05-03 VITALS — SYSTOLIC BLOOD PRESSURE: 132 MMHG | WEIGHT: 226 LBS | BODY MASS INDEX: 44.14 KG/M2 | DIASTOLIC BLOOD PRESSURE: 80 MMHG

## 2024-05-03 DIAGNOSIS — M48.02 CERVICAL STENOSIS OF SPINE: Primary | ICD-10-CM

## 2024-05-03 DIAGNOSIS — K21.9 GASTROESOPHAGEAL REFLUX DISEASE WITHOUT ESOPHAGITIS: ICD-10-CM

## 2024-05-03 PROCEDURE — 3075F SYST BP GE 130 - 139MM HG: CPT | Performed by: STUDENT IN AN ORGANIZED HEALTH CARE EDUCATION/TRAINING PROGRAM

## 2024-05-03 PROCEDURE — 3079F DIAST BP 80-89 MM HG: CPT | Performed by: STUDENT IN AN ORGANIZED HEALTH CARE EDUCATION/TRAINING PROGRAM

## 2024-05-03 PROCEDURE — 1159F MED LIST DOCD IN RCRD: CPT | Performed by: STUDENT IN AN ORGANIZED HEALTH CARE EDUCATION/TRAINING PROGRAM

## 2024-05-03 PROCEDURE — 1036F TOBACCO NON-USER: CPT | Performed by: STUDENT IN AN ORGANIZED HEALTH CARE EDUCATION/TRAINING PROGRAM

## 2024-05-03 PROCEDURE — 99213 OFFICE O/P EST LOW 20 MIN: CPT | Performed by: STUDENT IN AN ORGANIZED HEALTH CARE EDUCATION/TRAINING PROGRAM

## 2024-05-03 PROCEDURE — 3008F BODY MASS INDEX DOCD: CPT | Performed by: STUDENT IN AN ORGANIZED HEALTH CARE EDUCATION/TRAINING PROGRAM

## 2024-05-03 RX ORDER — ESOMEPRAZOLE MAGNESIUM 40 MG/1
40 CAPSULE, DELAYED RELEASE ORAL DAILY
Qty: 90 CAPSULE | Refills: 1 | Status: SHIPPED | OUTPATIENT
Start: 2024-05-03

## 2024-05-03 NOTE — PROGRESS NOTES
Follow up and med refill    Subjective   Patient ID: Fadumo Lassiter is a 68 y.o. female who presents for Follow-up and Med Refill.    HPI     Follow-up.  Medication refill.  Had decreased second oxycodone of the day from 5 mg daily to 2.5 mg daily.  Has been doing more stair workouts at the gym, up to an hour a few times a week.  Daughter will be in town visiting this weekend. Left ear still bothering her at times, itchy    Review of Systems    8 point review of systems is otherwise negative unless mentioned on HPI      Objective   /80   Wt 103 kg (226 lb)   BMI 44.14 kg/m²     Physical Exam    General: No acute distress  HEENT: EOMI  CV: Regular rate and rhythm, normal S1 and S2, no murmurs  Pulm: Clear to auscultation bilaterally, no wheezings, rales or rhonchi  Abd: Nondistended  Skin: No rashes   Lymphatic: No lymphadenopathy      Assessment/Plan       Chronic low back pain  History of multiple spinal surgeries  -Follows with spine surgery  -Had been on OxyContin in the past and was able to wean off  -Discussed goal to continue to wean opiate pain medications with goal of discontinuing completely  -At the beginning was on oxycodone 15 mg 3 times daily  -Oxycodone 2.5mg in the morning and early after noon and 5mg in the later afternoon and evening. Deciding which one will go down to 2.5mg next  -Restless leg syndrome has been increased as oxycodone is decreased, in the past had discussed ropinirole, opted to hold off for now  -Had been on gabapentin and lyrica in the past that were stopped for side effects  -Positive WHITNEY, has seen rheumatology with reassuring workup, considering starting silvina chi  -Not currently interested in injections at this time to follow-up with pain management   -Takes naproxen sparingly, discussed option of other NSAIDs (meloxicam or diclofenac), would like to continue with naproxen as needed for now   -OAARS reviewed, urine screen 11/2023      Hypertension  -Continue lisinopril 5  mg daily  -Continue atenolol 25 mg daily (takes 1/2 50mg atenolol), may consider increasing back to 50mg in the future       History of dental infection  -Was on Augmentin, tooth has since been removed, planned for bridge. Some intermittent times of diarrhea but then on other days will have more normal bowel movements  -Due to history of orthopedic joint replacement, had been recommended to take prophylactic antibiotics prior to dental procedures.       Prior episode of dysuria  -Ordered UA/culture for if recurs again  -Discussed cranberry and D mannose supplements      Hypothyroidism  -Continue levothyroxine 88 mcg daily     GERD  -Continue esomeprazole 40 mg daily     Health maintenance  -Mammogram previously ordered  -Colonoscopy 2022, follow in 10 years  -Received flu and COVID boosters     RTC 1 month     This note was dictated by speech recognition. Minor errors in transcription may be present.

## 2024-05-16 DIAGNOSIS — M54.50 LUMBAR BACK PAIN: ICD-10-CM

## 2024-05-17 RX ORDER — OXYCODONE HYDROCHLORIDE 5 MG/1
5 TABLET ORAL
Qty: 150 TABLET | Refills: 0 | Status: SHIPPED | OUTPATIENT
Start: 2024-05-17 | End: 2024-06-16

## 2024-06-18 ENCOUNTER — APPOINTMENT (OUTPATIENT)
Dept: PRIMARY CARE | Facility: CLINIC | Age: 69
End: 2024-06-18
Payer: COMMERCIAL

## 2024-06-18 VITALS — DIASTOLIC BLOOD PRESSURE: 84 MMHG | SYSTOLIC BLOOD PRESSURE: 124 MMHG

## 2024-06-18 DIAGNOSIS — M54.50 LUMBAR BACK PAIN: Primary | ICD-10-CM

## 2024-06-18 PROCEDURE — 3074F SYST BP LT 130 MM HG: CPT | Performed by: STUDENT IN AN ORGANIZED HEALTH CARE EDUCATION/TRAINING PROGRAM

## 2024-06-18 PROCEDURE — 99213 OFFICE O/P EST LOW 20 MIN: CPT | Performed by: STUDENT IN AN ORGANIZED HEALTH CARE EDUCATION/TRAINING PROGRAM

## 2024-06-18 PROCEDURE — 3008F BODY MASS INDEX DOCD: CPT | Performed by: STUDENT IN AN ORGANIZED HEALTH CARE EDUCATION/TRAINING PROGRAM

## 2024-06-18 PROCEDURE — 1036F TOBACCO NON-USER: CPT | Performed by: STUDENT IN AN ORGANIZED HEALTH CARE EDUCATION/TRAINING PROGRAM

## 2024-06-18 PROCEDURE — 3079F DIAST BP 80-89 MM HG: CPT | Performed by: STUDENT IN AN ORGANIZED HEALTH CARE EDUCATION/TRAINING PROGRAM

## 2024-06-18 NOTE — PROGRESS NOTES
Follow up and sinus issues and insect bite (spider)    Subjective   Patient ID: Fadumo Lassiter is a 69 y.o. female who presents for Follow-up, Insect Bite, and sinus issues.    HPI     Insect bite, likely spider on back of left heel around Achilles.  Had been traveling to visit family.  Had uncomfortable feeling and then looked back there and had seen to little bite marks.  Has been using Neosporin.    Allergies have also been acting out more, possible sinus infection    Continues works on weaning pain medication, oxycodone has been 2.5 for the 2 times at the beginning of the day and 5 mg for the 2 times towards the end of the day.  Difficulties with sleep and restless leg    Review of Systems    8 point review of systems is otherwise negative unless mentioned on HPI      Objective   /84     Physical Exam    General: No acute distress  HEENT: EOMI  CV: Regular rate and rhythm, normal S1 and S2, no murmurs  Pulm: Clear to auscultation bilaterally, no wheezings, rales or rhonchi  Abd: Nondistended  MSK: 5/5 strength in all extremities  Skin: Some erythema around left heel, no drainage, no eschar   Lymphatic: No lymphadenopathy      Assessment/Plan       Chronic low back pain  History of multiple spinal surgeries  -Follows with spine surgery  -Had been on OxyContin in the past and was able to wean off  -Discussed goal to continue to wean opiate pain medications with goal of discontinuing completely  -At the beginning was on oxycodone 15 mg 3 times daily  -Oxycodone 2.5mg in the morning and early afternoon and 5mg in the later afternoon and evening. Deciding which one will go down to 2.5mg next, remains motivated   -Restless leg syndrome has been increased as oxycodone is decreased, in the past had discussed ropinirole, opted to hold off for now  -Had been on gabapentin and lyrica in the past that were stopped for side effects  -Positive WHITNEY, has seen rheumatology with reassuring workup, considering starting silvina  chi  -Not currently interested in injections at this time to follow-up with pain management   -Takes naproxen sparingly, discussed option of other NSAIDs (meloxicam or diclofenac), would like to continue with naproxen as needed for now   -OAARS reviewed, urine screen 11/2023    Sinusitis  -Flonase and will start antihistamine  -Will let us know if does not improve       Hypertension  -Continue lisinopril 5 mg daily  -Continue atenolol 25 mg daily (takes 1/2 50mg atenolol), may consider increasing back to 50mg in the future       History of dental infection  -Was on Augmentin, tooth has since been removed, planned for bridge. Some intermittent times of diarrhea but then on other days will have more normal bowel movements  -Due to history of orthopedic joint replacement, had been recommended to take prophylactic antibiotics prior to dental procedures.       Prior episode of dysuria  -Ordered UA/culture for if recurs again  -Discussed cranberry and D mannose supplements      Hypothyroidism  -Continue levothyroxine 88 mcg daily     GERD  -Continue esomeprazole 40 mg daily     Health maintenance  -Mammogram previously ordered  -Colonoscopy 2022, follow in 10 years  -Received flu and COVID boosters     RTC 1 month     This note was dictated by speech recognition. Minor errors in transcription may be present.

## 2024-07-16 ENCOUNTER — TELEPHONE (OUTPATIENT)
Dept: PRIMARY CARE | Facility: CLINIC | Age: 69
End: 2024-07-16
Payer: COMMERCIAL

## 2024-07-16 NOTE — TELEPHONE ENCOUNTER
Has scheduled appt with you August 23/couldn't get anything sooner. Needs refill on oxy. And is having symptoms for uti, asking if you could call in something for that.

## 2024-07-17 DIAGNOSIS — M54.50 LUMBAR BACK PAIN: ICD-10-CM

## 2024-07-17 DIAGNOSIS — R30.0 DYSURIA: Primary | ICD-10-CM

## 2024-07-17 RX ORDER — OXYCODONE HYDROCHLORIDE 5 MG/1
5 TABLET ORAL
Qty: 150 TABLET | Refills: 0 | Status: SHIPPED | OUTPATIENT
Start: 2024-07-17 | End: 2024-08-16

## 2024-07-17 RX ORDER — NITROFURANTOIN 25; 75 MG/1; MG/1
100 CAPSULE ORAL 2 TIMES DAILY
Qty: 10 CAPSULE | Refills: 0 | Status: SHIPPED | OUTPATIENT
Start: 2024-07-17 | End: 2024-07-22

## 2024-08-13 DIAGNOSIS — E03.9 HYPOTHYROIDISM, UNSPECIFIED TYPE: ICD-10-CM

## 2024-08-13 RX ORDER — LEVOTHYROXINE SODIUM 88 UG/1
TABLET ORAL
Qty: 96 TABLET | Refills: 1 | Status: SHIPPED | OUTPATIENT
Start: 2024-08-13

## 2024-08-14 ENCOUNTER — APPOINTMENT (OUTPATIENT)
Dept: PRIMARY CARE | Facility: CLINIC | Age: 69
End: 2024-08-14
Payer: COMMERCIAL

## 2024-08-14 VITALS — BODY MASS INDEX: 42.97 KG/M2 | WEIGHT: 220 LBS | SYSTOLIC BLOOD PRESSURE: 124 MMHG | DIASTOLIC BLOOD PRESSURE: 90 MMHG

## 2024-08-14 DIAGNOSIS — M54.50 LUMBAR BACK PAIN: Primary | ICD-10-CM

## 2024-08-14 PROCEDURE — 3080F DIAST BP >= 90 MM HG: CPT | Performed by: STUDENT IN AN ORGANIZED HEALTH CARE EDUCATION/TRAINING PROGRAM

## 2024-08-14 PROCEDURE — 1036F TOBACCO NON-USER: CPT | Performed by: STUDENT IN AN ORGANIZED HEALTH CARE EDUCATION/TRAINING PROGRAM

## 2024-08-14 PROCEDURE — 99213 OFFICE O/P EST LOW 20 MIN: CPT | Performed by: STUDENT IN AN ORGANIZED HEALTH CARE EDUCATION/TRAINING PROGRAM

## 2024-08-14 PROCEDURE — 3074F SYST BP LT 130 MM HG: CPT | Performed by: STUDENT IN AN ORGANIZED HEALTH CARE EDUCATION/TRAINING PROGRAM

## 2024-08-14 NOTE — PROGRESS NOTES
Follow up    Subjective   Patient ID: Fadumo Lassiter is a 69 y.o. female who presents for Follow-up.    HPI     Left heel has continued to be intermittently bothersome.  Has avoided taking her shoes off without untying them.  Also has been doing some calf stretching without exercising.  Does notice comfortable bit more after periods of being more active    Oxycodone has decreased to 2.5 mg 4 times a day as needed    Restless leg has been a little bit more noticeable    Review of Systems    8 point review of systems is otherwise negative unless mentioned on HPI      Objective   /90   Wt 99.8 kg (220 lb)   BMI 42.97 kg/m²     Physical Exam     General: No acute distress  HEENT: EOMI  CV: Regular rate and rhythm, normal S1 and S2, no murmurs  Pulm: Clear to auscultation bilaterally, no wheezings, rales or rhonchi  Abd: Nondistended  Lymphatic: No lymphadenopathy    Assessment/Plan       Chronic low back pain  History of multiple spinal surgeries  -Follows with spine surgery  -Had been on OxyContin in the past and was able to wean off  -Discussed goal to continue to wean opiate pain medications with goal of discontinuing completely  -At the beginning was on oxycodone 15 mg 3 times daily  -Has been able to decrease to oxycodone 2.5 mg 4 times daily.  Will be determining when going down to 3 times a day what times will be most beneficial  -Restless leg syndrome has been increased as oxycodone is decreased, in the past had discussed ropinirole, opted to hold off for now  -Had been on gabapentin and lyrica in the past that were stopped for side effects  -Positive WHITNEY, has seen rheumatology with reassuring workup, considering starting silvina chi  -Not currently interested in injections at this time to follow-up with pain management   -Takes naproxen sparingly, discussed option of other NSAIDs (meloxicam or diclofenac), would like to continue with naproxen as needed for now   -OAARS reviewed, urine screen 11/2023      Sinusitis  -Flonase and continue antihistamine      Hypertension  -Continue lisinopril 5 mg daily  -Continue atenolol 25 mg daily (takes 1/2 50mg atenolol), may consider increasing back to 50mg in the future      Possible Achilles tendinitis  -Discussed some additional stretching exercises to be done before activity     History of dental infection  -Was on Augmentin, tooth has since been removed, planned for bridge. Some intermittent times of diarrhea but then on other days will have more normal bowel movements  -Due to history of orthopedic joint replacement, had been recommended to take prophylactic antibiotics prior to dental procedures.       Prior episode of dysuria  -Ordered UA/culture for if recurs again  -Discussed cranberry and D mannose supplements      Hypothyroidism  -Continue levothyroxine 88 mcg daily     GERD  -Continue esomeprazole 40 mg daily     Health maintenance  -Mammogram previously ordered  -Colonoscopy 2022, follow in 10 years  -Received flu and COVID boosters     RTC 1 month     This note was dictated by speech recognition. Minor errors in transcription may be present.

## 2024-08-23 ENCOUNTER — APPOINTMENT (OUTPATIENT)
Dept: PRIMARY CARE | Facility: CLINIC | Age: 69
End: 2024-08-23
Payer: COMMERCIAL

## 2024-08-28 ENCOUNTER — TELEPHONE (OUTPATIENT)
Dept: PRIMARY CARE | Facility: CLINIC | Age: 69
End: 2024-08-28
Payer: COMMERCIAL

## 2024-08-28 DIAGNOSIS — I10 HYPERTENSION, UNSPECIFIED TYPE: ICD-10-CM

## 2024-08-29 DIAGNOSIS — M54.50 LUMBAR BACK PAIN: ICD-10-CM

## 2024-08-29 RX ORDER — LISINOPRIL 5 MG/1
5 TABLET ORAL DAILY
Qty: 90 TABLET | Refills: 1 | Status: SHIPPED | OUTPATIENT
Start: 2024-08-29

## 2024-08-29 RX ORDER — OXYCODONE HYDROCHLORIDE 5 MG/1
2.5 TABLET ORAL 4 TIMES DAILY
Qty: 60 TABLET | Refills: 0 | Status: SHIPPED | OUTPATIENT
Start: 2024-08-29 | End: 2024-09-28

## 2024-09-18 ENCOUNTER — APPOINTMENT (OUTPATIENT)
Dept: PRIMARY CARE | Facility: CLINIC | Age: 69
End: 2024-09-18
Payer: COMMERCIAL

## 2024-10-13 DIAGNOSIS — K21.9 GASTROESOPHAGEAL REFLUX DISEASE WITHOUT ESOPHAGITIS: ICD-10-CM

## 2024-10-14 DIAGNOSIS — M54.50 LUMBAR BACK PAIN: ICD-10-CM

## 2024-10-14 RX ORDER — ESOMEPRAZOLE MAGNESIUM 40 MG/1
40 CAPSULE, DELAYED RELEASE ORAL DAILY
Qty: 90 CAPSULE | Refills: 1 | Status: SHIPPED | OUTPATIENT
Start: 2024-10-14

## 2024-10-14 RX ORDER — OXYCODONE HYDROCHLORIDE 5 MG/1
2.5 TABLET ORAL 4 TIMES DAILY
Qty: 60 TABLET | Refills: 0 | Status: SHIPPED | OUTPATIENT
Start: 2024-10-14 | End: 2024-11-13

## 2024-10-29 ENCOUNTER — APPOINTMENT (OUTPATIENT)
Dept: PRIMARY CARE | Facility: CLINIC | Age: 69
End: 2024-10-29
Payer: COMMERCIAL

## 2024-10-29 VITALS — SYSTOLIC BLOOD PRESSURE: 124 MMHG | WEIGHT: 217 LBS | BODY MASS INDEX: 42.38 KG/M2 | DIASTOLIC BLOOD PRESSURE: 80 MMHG

## 2024-10-29 DIAGNOSIS — G95.9 DISEASE OF SPINAL CORD, UNSPECIFIED: Primary | ICD-10-CM

## 2024-10-29 DIAGNOSIS — Z12.12 SCREENING FOR COLORECTAL CANCER: ICD-10-CM

## 2024-10-29 DIAGNOSIS — Z00.00 HEALTHCARE MAINTENANCE: ICD-10-CM

## 2024-10-29 DIAGNOSIS — Z12.11 SCREENING FOR COLORECTAL CANCER: ICD-10-CM

## 2024-10-29 DIAGNOSIS — Z51.81 ENCOUNTER FOR THERAPEUTIC DRUG LEVEL MONITORING: ICD-10-CM

## 2024-10-29 DIAGNOSIS — L29.9 EAR ITCH: ICD-10-CM

## 2024-10-29 PROCEDURE — 3074F SYST BP LT 130 MM HG: CPT | Performed by: STUDENT IN AN ORGANIZED HEALTH CARE EDUCATION/TRAINING PROGRAM

## 2024-10-29 PROCEDURE — 99213 OFFICE O/P EST LOW 20 MIN: CPT | Performed by: STUDENT IN AN ORGANIZED HEALTH CARE EDUCATION/TRAINING PROGRAM

## 2024-10-29 PROCEDURE — 1159F MED LIST DOCD IN RCRD: CPT | Performed by: STUDENT IN AN ORGANIZED HEALTH CARE EDUCATION/TRAINING PROGRAM

## 2024-10-29 PROCEDURE — 3079F DIAST BP 80-89 MM HG: CPT | Performed by: STUDENT IN AN ORGANIZED HEALTH CARE EDUCATION/TRAINING PROGRAM

## 2024-10-29 RX ORDER — FLUOCINOLONE ACETONIDE 0.11 MG/ML
5 OIL AURICULAR (OTIC) 2 TIMES DAILY
Qty: 20 ML | Refills: 0 | Status: SHIPPED | OUTPATIENT
Start: 2024-10-29

## 2024-10-29 ASSESSMENT — PATIENT HEALTH QUESTIONNAIRE - PHQ9
1. LITTLE INTEREST OR PLEASURE IN DOING THINGS: NOT AT ALL
SUM OF ALL RESPONSES TO PHQ9 QUESTIONS 1 AND 2: 0
2. FEELING DOWN, DEPRESSED OR HOPELESS: NOT AT ALL

## 2024-12-03 ENCOUNTER — APPOINTMENT (OUTPATIENT)
Dept: PRIMARY CARE | Facility: CLINIC | Age: 69
End: 2024-12-03
Payer: COMMERCIAL

## 2024-12-03 DIAGNOSIS — I10 HYPERTENSION, UNSPECIFIED TYPE: ICD-10-CM

## 2024-12-03 DIAGNOSIS — M54.50 LUMBAR BACK PAIN: ICD-10-CM

## 2024-12-03 RX ORDER — OXYCODONE HYDROCHLORIDE 5 MG/1
2.5 TABLET ORAL 4 TIMES DAILY
Qty: 60 TABLET | Refills: 0 | Status: SHIPPED | OUTPATIENT
Start: 2024-12-03 | End: 2025-01-02

## 2024-12-03 RX ORDER — LISINOPRIL 5 MG/1
5 TABLET ORAL DAILY
Qty: 90 TABLET | Refills: 0 | Status: SHIPPED | OUTPATIENT
Start: 2024-12-03

## 2024-12-05 DIAGNOSIS — I10 HYPERTENSION, UNSPECIFIED TYPE: ICD-10-CM

## 2024-12-05 RX ORDER — ATENOLOL 50 MG/1
25 TABLET ORAL DAILY
Qty: 45 TABLET | Refills: 0 | Status: SHIPPED | OUTPATIENT
Start: 2024-12-05

## 2025-01-02 DIAGNOSIS — M54.50 LUMBAR BACK PAIN: ICD-10-CM

## 2025-01-02 RX ORDER — OXYCODONE HYDROCHLORIDE 5 MG/1
2.5 TABLET ORAL 4 TIMES DAILY
Qty: 60 TABLET | Refills: 0 | Status: SHIPPED | OUTPATIENT
Start: 2025-01-02 | End: 2025-02-01

## 2025-01-07 ENCOUNTER — APPOINTMENT (OUTPATIENT)
Dept: PRIMARY CARE | Facility: CLINIC | Age: 70
End: 2025-01-07
Payer: COMMERCIAL

## 2025-01-13 LAB
NON-UH HIE A/G RATIO: 1.3
NON-UH HIE ALB: 3.7 G/DL (ref 3.4–5)
NON-UH HIE ALK PHOS: 132 UNIT/L (ref 45–117)
NON-UH HIE BASO COUNT: 0.06 X1000 (ref 0–0.2)
NON-UH HIE BASOS %: 0.8 %
NON-UH HIE BILIRUBIN, TOTAL: 0.7 MG/DL (ref 0.3–1.2)
NON-UH HIE BUN/CREAT RATIO: 20
NON-UH HIE BUN: 14 MG/DL (ref 9–23)
NON-UH HIE CALCIUM: 9.7 MG/DL (ref 8.7–10.4)
NON-UH HIE CALCULATED LDL CHOLESTEROL: 130 MG/DL (ref 60–130)
NON-UH HIE CALCULATED OSMOLALITY: 276 MOSM/KG (ref 275–295)
NON-UH HIE CHLORIDE: 105 MMOL/L (ref 98–107)
NON-UH HIE CHOLESTEROL: 216 MG/DL (ref 100–200)
NON-UH HIE CO2, VENOUS: 28 MMOL/L (ref 20–31)
NON-UH HIE CREATININE: 0.7 MG/DL (ref 0.5–0.8)
NON-UH HIE DIFF?: NO
NON-UH HIE EOS COUNT: 0.17 X1000 (ref 0–0.5)
NON-UH HIE EOSIN %: 2.6 %
NON-UH HIE GFR AA: >60
NON-UH HIE GLOBULIN: 2.9 G/DL
NON-UH HIE GLOMERULAR FILTRATION RATE: >60 ML/MIN/1.73M?
NON-UH HIE GLUCOSE: 96 MG/DL (ref 74–106)
NON-UH HIE GOT: 21 UNIT/L (ref 15–37)
NON-UH HIE GPT: 17 UNIT/L (ref 10–49)
NON-UH HIE HCT: 44.1 % (ref 36–46)
NON-UH HIE HDL CHOLESTEROL: 73 MG/DL (ref 40–60)
NON-UH HIE HGB A1C: 5.1 %
NON-UH HIE HGB: 14.9 G/DL (ref 12–16)
NON-UH HIE INSTR WBC: 6.6
NON-UH HIE K: 4.5 MMOL/L (ref 3.5–5.1)
NON-UH HIE LYMPH %: 22.7 %
NON-UH HIE LYMPH COUNT: 1.51 X1000 (ref 1.2–4.8)
NON-UH HIE MCH: 30.1 PG (ref 27–34)
NON-UH HIE MCHC: 33.8 G/DL (ref 32–37)
NON-UH HIE MCV: 89 FL (ref 80–100)
NON-UH HIE MONO %: 6.2 %
NON-UH HIE MONO COUNT: 0.41 X1000 (ref 0.1–1)
NON-UH HIE MPV: 7.5 FL (ref 7.4–10.4)
NON-UH HIE NA: 138 MMOL/L (ref 135–145)
NON-UH HIE NEUTROPHIL %: 67.7 %
NON-UH HIE NEUTROPHIL COUNT (ANC): 4.5 X1000 (ref 1.4–8.8)
NON-UH HIE NUCLEATED RBC: 0 /100WBC
NON-UH HIE PLATELET: 292 X10 (ref 150–450)
NON-UH HIE RBC: 4.96 X10 (ref 4.2–5.4)
NON-UH HIE RDW: 12.8 % (ref 11.5–14.5)
NON-UH HIE TOTAL CHOL/HDL CHOL RATIO: 3
NON-UH HIE TOTAL PROTEIN: 6.6 G/DL (ref 5.7–8.2)
NON-UH HIE TRIGLYCERIDES: 64 MG/DL (ref 30–150)
NON-UH HIE TSH: 0.93 UIU/ML (ref 0.55–4.78)
NON-UH HIE WBC: 6.6 X10 (ref 4.5–11)

## 2025-01-18 LAB — NON-UH HIE MISC SENDOUT: NORMAL

## 2025-01-21 ENCOUNTER — APPOINTMENT (OUTPATIENT)
Dept: PRIMARY CARE | Facility: CLINIC | Age: 70
End: 2025-01-21
Payer: COMMERCIAL

## 2025-01-21 VITALS
BODY MASS INDEX: 42.8 KG/M2 | WEIGHT: 218 LBS | DIASTOLIC BLOOD PRESSURE: 84 MMHG | HEIGHT: 60 IN | SYSTOLIC BLOOD PRESSURE: 126 MMHG

## 2025-01-21 DIAGNOSIS — Z00.00 ROUTINE GENERAL MEDICAL EXAMINATION AT HEALTH CARE FACILITY: Primary | ICD-10-CM

## 2025-01-21 DIAGNOSIS — E03.9 HYPOTHYROIDISM, UNSPECIFIED TYPE: ICD-10-CM

## 2025-01-21 DIAGNOSIS — G95.9 DISEASE OF SPINAL CORD, UNSPECIFIED: ICD-10-CM

## 2025-01-21 PROCEDURE — 1159F MED LIST DOCD IN RCRD: CPT | Performed by: STUDENT IN AN ORGANIZED HEALTH CARE EDUCATION/TRAINING PROGRAM

## 2025-01-21 PROCEDURE — 3074F SYST BP LT 130 MM HG: CPT | Performed by: STUDENT IN AN ORGANIZED HEALTH CARE EDUCATION/TRAINING PROGRAM

## 2025-01-21 PROCEDURE — G0439 PPPS, SUBSEQ VISIT: HCPCS | Performed by: STUDENT IN AN ORGANIZED HEALTH CARE EDUCATION/TRAINING PROGRAM

## 2025-01-21 PROCEDURE — 1036F TOBACCO NON-USER: CPT | Performed by: STUDENT IN AN ORGANIZED HEALTH CARE EDUCATION/TRAINING PROGRAM

## 2025-01-21 PROCEDURE — 99213 OFFICE O/P EST LOW 20 MIN: CPT | Performed by: STUDENT IN AN ORGANIZED HEALTH CARE EDUCATION/TRAINING PROGRAM

## 2025-01-21 PROCEDURE — 1160F RVW MEDS BY RX/DR IN RCRD: CPT | Performed by: STUDENT IN AN ORGANIZED HEALTH CARE EDUCATION/TRAINING PROGRAM

## 2025-01-21 PROCEDURE — 1158F ADVNC CARE PLAN TLK DOCD: CPT | Performed by: STUDENT IN AN ORGANIZED HEALTH CARE EDUCATION/TRAINING PROGRAM

## 2025-01-21 PROCEDURE — 3079F DIAST BP 80-89 MM HG: CPT | Performed by: STUDENT IN AN ORGANIZED HEALTH CARE EDUCATION/TRAINING PROGRAM

## 2025-01-21 PROCEDURE — 1170F FXNL STATUS ASSESSED: CPT | Performed by: STUDENT IN AN ORGANIZED HEALTH CARE EDUCATION/TRAINING PROGRAM

## 2025-01-21 PROCEDURE — 1123F ACP DISCUSS/DSCN MKR DOCD: CPT | Performed by: STUDENT IN AN ORGANIZED HEALTH CARE EDUCATION/TRAINING PROGRAM

## 2025-01-21 PROCEDURE — 3008F BODY MASS INDEX DOCD: CPT | Performed by: STUDENT IN AN ORGANIZED HEALTH CARE EDUCATION/TRAINING PROGRAM

## 2025-01-21 RX ORDER — LEVOTHYROXINE SODIUM 88 UG/1
TABLET ORAL
Qty: 96 TABLET | Refills: 1 | Status: SHIPPED | OUTPATIENT
Start: 2025-01-21

## 2025-01-21 ASSESSMENT — ACTIVITIES OF DAILY LIVING (ADL)
GROCERY_SHOPPING: INDEPENDENT
DRESSING: INDEPENDENT
DOING_HOUSEWORK: INDEPENDENT
MANAGING_FINANCES: INDEPENDENT
TAKING_MEDICATION: INDEPENDENT
BATHING: INDEPENDENT

## 2025-01-21 ASSESSMENT — ENCOUNTER SYMPTOMS
LOSS OF SENSATION IN FEET: 0
OCCASIONAL FEELINGS OF UNSTEADINESS: 0
DEPRESSION: 0

## 2025-01-21 NOTE — PROGRESS NOTES
Subjective   Patient ID: Fadumo Lassiter is a 69 y.o. female who presents for Medicare Annual Wellness Visit Subsequent (Med refill).    HPI     Presents for follow-up  Had DEXA of left arm (cannot do L spine due to spine surgeries)  Had 11% bone loss compared to prior  Unsure of the T score but was in the osteoporosis range  Had discussed bisphosphonates and prolia and considering   Decreasing oxycodone from 7.5mg daily to 5mg daily (2.5mg BID)  Lab review    Review of Systems    8 point review of systems is otherwise negative unless mentioned on HPI      Objective   /84   Ht 1.524 m (5')   Wt 98.9 kg (218 lb)   BMI 42.58 kg/m²     Physical Exam    Constitutional:       Appearance: Normal appearance.   HENT:      Head: Atraumatic.   Eyes:      Extraocular Movements: Extraocular movements intact.   Musculoskeletal:      Cervical back: Neck supple.   Neurological:      General: No focal deficit present.   Psychiatric:         Mood and Affect: Mood normal.        Assessment/Plan       Chronic low back pain  History of multiple spinal surgeries  -Follows with spine surgery  -Had been on OxyContin in the past and was able to wean off  -Discussed goal to continue to wean opiate pain medications with goal of discontinuing completely  -At the beginning was on oxycodone 15 mg 3 times daily  -Has been able to decrease to oxycodone 2.5 mg 3 times daily.  Will be determining when going down to 2 times a day what times will be most beneficial  -Restless leg syndrome has been increased as oxycodone is decreased, in the past had discussed ropinirole, opted to hold off for now  -Had been on gabapentin and lyrica in the past that were stopped for side effects  -Positive WHITNEY, has seen rheumatology with reassuring workup, considering starting silvina chi  -Not currently interested in injections at this time to follow-up with pain management   -Takes naproxen sparingly, discussed option of other NSAIDs (meloxicam or diclofenac),  would like to continue with naproxen as needed for now   -OAARS reviewed, urine screen 11/2023, ordered for next month     Sinusitis  -Flonase and continue antihistamine    Osteoporosis  -Unclear of T score, was done through , considering bisphosphonates and prolia  -Vitamin D/calcium supplement, weight bearing exercise       Hypertension  -Continue lisinopril 5 mg daily  -Continue atenolol 25 mg daily (takes 1/2 50mg atenolol), may consider increasing back to 50mg in the future       Possible Achilles tendinitis  -Discussed some additional stretching exercises to be done before activity. Has been limited exercise as a result of the discomfort     History of dental infection  -Was on Augmentin, tooth has since been removed, planned for bridge. Some intermittent times of diarrhea but then on other days will have more normal bowel movements  -Due to history of orthopedic joint replacement, had been recommended to take prophylactic antibiotics prior to dental procedures.       Prior episode of dysuria  -Ordered UA/culture for if recurs again  -Discussed cranberry and D mannose supplements      Hypothyroidism  -Continue levothyroxine 88 mcg daily     GERD  -Continue esomeprazole 40 mg daily     Health maintenance  -Follows with GYN through  who orders mammograms and bone density   -Colonoscopy 2022, follow in 10 years  -Received flu and COVID boosters     RTC 1 month     This note was dictated by speech recognition. Minor errors in transcription may be present.

## 2025-02-07 DIAGNOSIS — M54.50 LUMBAR BACK PAIN: ICD-10-CM

## 2025-02-08 RX ORDER — OXYCODONE HYDROCHLORIDE 5 MG/1
2.5 TABLET ORAL 4 TIMES DAILY
Qty: 60 TABLET | Refills: 0 | Status: SHIPPED | OUTPATIENT
Start: 2025-02-08 | End: 2025-03-10

## 2025-02-14 ENCOUNTER — TELEPHONE (OUTPATIENT)
Dept: PRIMARY CARE | Facility: CLINIC | Age: 70
End: 2025-02-14
Payer: MEDICARE

## 2025-02-14 DIAGNOSIS — J11.1 FLU: Primary | ICD-10-CM

## 2025-02-14 RX ORDER — OSELTAMIVIR PHOSPHATE 75 MG/1
75 CAPSULE ORAL 2 TIMES DAILY
Qty: 10 CAPSULE | Refills: 0 | Status: SHIPPED | OUTPATIENT
Start: 2025-02-14 | End: 2025-02-19

## 2025-02-14 NOTE — TELEPHONE ENCOUNTER
Patient has been sick since wed, has the flu, fever and coughing. Has been taking mucinex and some left over tesslon pearles. Not helping. What else can she take.

## 2025-02-18 ENCOUNTER — APPOINTMENT (OUTPATIENT)
Dept: PRIMARY CARE | Facility: CLINIC | Age: 70
End: 2025-02-18
Payer: MEDICARE

## 2025-02-28 ENCOUNTER — APPOINTMENT (OUTPATIENT)
Dept: PRIMARY CARE | Facility: CLINIC | Age: 70
End: 2025-02-28
Payer: MEDICARE

## 2025-03-04 DIAGNOSIS — M54.50 LUMBAR BACK PAIN: ICD-10-CM

## 2025-03-04 RX ORDER — OXYCODONE HYDROCHLORIDE 5 MG/1
2.5 TABLET ORAL 4 TIMES DAILY
Qty: 60 TABLET | Refills: 0 | Status: SHIPPED | OUTPATIENT
Start: 2025-03-04 | End: 2025-04-03

## 2025-03-11 ENCOUNTER — APPOINTMENT (OUTPATIENT)
Dept: PRIMARY CARE | Facility: CLINIC | Age: 70
End: 2025-03-11
Payer: MEDICARE

## 2025-03-11 VITALS
HEIGHT: 60 IN | WEIGHT: 220 LBS | HEART RATE: 67 BPM | OXYGEN SATURATION: 96 % | SYSTOLIC BLOOD PRESSURE: 146 MMHG | DIASTOLIC BLOOD PRESSURE: 79 MMHG | BODY MASS INDEX: 43.19 KG/M2

## 2025-03-11 DIAGNOSIS — R06.2 WHEEZING: Primary | ICD-10-CM

## 2025-03-11 DIAGNOSIS — M48.02 CERVICAL STENOSIS OF SPINE: ICD-10-CM

## 2025-03-11 DIAGNOSIS — G25.81 RESTLESS LEG SYNDROME: ICD-10-CM

## 2025-03-11 PROCEDURE — 1036F TOBACCO NON-USER: CPT | Performed by: STUDENT IN AN ORGANIZED HEALTH CARE EDUCATION/TRAINING PROGRAM

## 2025-03-11 PROCEDURE — 1123F ACP DISCUSS/DSCN MKR DOCD: CPT | Performed by: STUDENT IN AN ORGANIZED HEALTH CARE EDUCATION/TRAINING PROGRAM

## 2025-03-11 PROCEDURE — 1160F RVW MEDS BY RX/DR IN RCRD: CPT | Performed by: STUDENT IN AN ORGANIZED HEALTH CARE EDUCATION/TRAINING PROGRAM

## 2025-03-11 PROCEDURE — 99214 OFFICE O/P EST MOD 30 MIN: CPT | Performed by: STUDENT IN AN ORGANIZED HEALTH CARE EDUCATION/TRAINING PROGRAM

## 2025-03-11 PROCEDURE — 3008F BODY MASS INDEX DOCD: CPT | Performed by: STUDENT IN AN ORGANIZED HEALTH CARE EDUCATION/TRAINING PROGRAM

## 2025-03-11 PROCEDURE — 3078F DIAST BP <80 MM HG: CPT | Performed by: STUDENT IN AN ORGANIZED HEALTH CARE EDUCATION/TRAINING PROGRAM

## 2025-03-11 PROCEDURE — G2211 COMPLEX E/M VISIT ADD ON: HCPCS | Performed by: STUDENT IN AN ORGANIZED HEALTH CARE EDUCATION/TRAINING PROGRAM

## 2025-03-11 PROCEDURE — 1159F MED LIST DOCD IN RCRD: CPT | Performed by: STUDENT IN AN ORGANIZED HEALTH CARE EDUCATION/TRAINING PROGRAM

## 2025-03-11 PROCEDURE — 1158F ADVNC CARE PLAN TLK DOCD: CPT | Performed by: STUDENT IN AN ORGANIZED HEALTH CARE EDUCATION/TRAINING PROGRAM

## 2025-03-11 PROCEDURE — 3077F SYST BP >= 140 MM HG: CPT | Performed by: STUDENT IN AN ORGANIZED HEALTH CARE EDUCATION/TRAINING PROGRAM

## 2025-03-11 RX ORDER — ALBUTEROL SULFATE 90 UG/1
2 INHALANT RESPIRATORY (INHALATION) EVERY 4 HOURS PRN
Qty: 18 G | Refills: 1 | Status: SHIPPED | OUTPATIENT
Start: 2025-03-11 | End: 2026-03-11

## 2025-03-11 ASSESSMENT — COLUMBIA-SUICIDE SEVERITY RATING SCALE - C-SSRS
2. HAVE YOU ACTUALLY HAD ANY THOUGHTS OF KILLING YOURSELF?: NO
6. HAVE YOU EVER DONE ANYTHING, STARTED TO DO ANYTHING, OR PREPARED TO DO ANYTHING TO END YOUR LIFE?: NO
1. IN THE PAST MONTH, HAVE YOU WISHED YOU WERE DEAD OR WISHED YOU COULD GO TO SLEEP AND NOT WAKE UP?: NO

## 2025-03-11 NOTE — PROGRESS NOTES
Subjective   Patient ID: Fadumo Lassiter is a 69 y.o. female who presents for Follow-up.    HPI     Presents for follow-up.  Currently takes 1.5 tablets daily of the 5 mg oxycodone.  Is broken up into 4 different times throughout the day.    Has been started on Prolia for osteoporosis.  Has noted a little bit more bone pain since starting the medication.  Has been incorporating weightbearing exercises.    Muscle spasms have been a little bit more predominant    Had been sick recently, not back to 100% yet.  Had noted some wheezing    Had switched around supplements a little bit, had felt that her nails were not as strong but then had returned, also noted that hearing or smell slightly different from how it usually does    Review of Systems    8 point review of systems is otherwise negative unless mentioned on HPI      Objective   /79 (BP Location: Left arm, Patient Position: Sitting, BP Cuff Size: Adult)   Pulse 67   Ht 1.524 m (5')   Wt 99.8 kg (220 lb)   SpO2 96%   BMI 42.97 kg/m²     Physical Exam    General: No acute distress  HEENT: EOMI  CV: Regular rate and rhythm, normal S1 and S2, no murmurs  Pulm: Clear to auscultation bilaterally, no wheezings, rales or rhonchi  Abd: Nondistended  MSK: 5/5 strength in all extremities  Skin: No rashes   Lymphatic: No lymphadenopathy      Assessment/Plan       Chronic low back pain  History of multiple spinal surgeries  -Follows with spine surgery  -Had been on OxyContin in the past and was able to wean off  -Discussed goal to continue to wean opiate pain medications with goal of discontinuing completely  -At the beginning was on oxycodone 15 mg 3 times daily  -Has been able to decrease to oxycodone 5 mg tablets and takes 1.5 tablets daily  -Restless leg syndrome has been increased as oxycodone is decreased, in the past had discussed ropinirole, opted to hold off for now  -Had been on gabapentin and lyrica in the past that were stopped for side effects  -Positive  WHITNEY, has seen rheumatology with reassuring workup, considering starting silvina chi  -Not currently interested in injections at this time to follow-up with pain management   -Takes naproxen sparingly, discussed option of other NSAIDs (meloxicam or diclofenac), would like to continue with naproxen as needed for now   -OAARS reviewed, Most recent urine screen through [facility], 1/2025     Sinusitis  -Flonase and continue antihistamine     Osteoporosis  -Following with [facility], has been started on Prolia in 2025  -Vitamin D/calcium supplement, weight bearing exercise       Hypertension  -Continue lisinopril 5 mg daily  -Continue atenolol 25 mg daily (takes 1/2 50mg atenolol), may consider increasing back to 50mg in the future       Possible Achilles tendinitis  -Discussed some additional stretching exercises to be done before activity. Has been limited exercise as a result of the discomfort     History of dental infection  -Was on Augmentin, tooth has since been removed, planned for bridge. Some intermittent times of diarrhea but then on other days will have more normal bowel movements  -Due to history of orthopedic joint replacement, had been recommended to take prophylactic antibiotics prior to dental procedures.       Prior episode of dysuria  -Ordered UA/culture for if recurs again  -Discussed cranberry and D mannose supplements      Hypothyroidism  -Continue levothyroxine 88 mcg daily     GERD  -Continue esomeprazole 40 mg daily     Health maintenance  -Follows with GYN through  who orders mammograms and bone density   -Colonoscopy 2022, follow in 10 years  -Received flu and COVID boosters     RTC 1 month     This note was dictated by speech recognition. Minor errors in transcription may be present.

## 2025-03-14 DIAGNOSIS — I10 HYPERTENSION, UNSPECIFIED TYPE: ICD-10-CM

## 2025-03-14 RX ORDER — ATENOLOL 50 MG/1
25 TABLET ORAL DAILY
Qty: 45 TABLET | Refills: 0 | Status: SHIPPED | OUTPATIENT
Start: 2025-03-14

## 2025-04-10 DIAGNOSIS — M54.50 LUMBAR BACK PAIN: ICD-10-CM

## 2025-04-10 RX ORDER — OXYCODONE HYDROCHLORIDE 5 MG/1
2.5 TABLET ORAL 4 TIMES DAILY
Qty: 60 TABLET | Refills: 0 | Status: SHIPPED | OUTPATIENT
Start: 2025-04-10 | End: 2025-05-10

## 2025-04-15 ENCOUNTER — APPOINTMENT (OUTPATIENT)
Dept: PRIMARY CARE | Facility: CLINIC | Age: 70
End: 2025-04-15
Payer: MEDICARE

## 2025-04-15 VITALS — WEIGHT: 220 LBS | SYSTOLIC BLOOD PRESSURE: 144 MMHG | BODY MASS INDEX: 42.97 KG/M2 | DIASTOLIC BLOOD PRESSURE: 84 MMHG

## 2025-04-15 DIAGNOSIS — M54.50 LUMBAR BACK PAIN: Primary | ICD-10-CM

## 2025-04-15 DIAGNOSIS — K21.9 GASTROESOPHAGEAL REFLUX DISEASE WITHOUT ESOPHAGITIS: ICD-10-CM

## 2025-04-15 PROCEDURE — 3079F DIAST BP 80-89 MM HG: CPT | Performed by: STUDENT IN AN ORGANIZED HEALTH CARE EDUCATION/TRAINING PROGRAM

## 2025-04-15 PROCEDURE — 3077F SYST BP >= 140 MM HG: CPT | Performed by: STUDENT IN AN ORGANIZED HEALTH CARE EDUCATION/TRAINING PROGRAM

## 2025-04-15 PROCEDURE — 1159F MED LIST DOCD IN RCRD: CPT | Performed by: STUDENT IN AN ORGANIZED HEALTH CARE EDUCATION/TRAINING PROGRAM

## 2025-04-15 PROCEDURE — 99213 OFFICE O/P EST LOW 20 MIN: CPT | Performed by: STUDENT IN AN ORGANIZED HEALTH CARE EDUCATION/TRAINING PROGRAM

## 2025-04-15 PROCEDURE — G2211 COMPLEX E/M VISIT ADD ON: HCPCS | Performed by: STUDENT IN AN ORGANIZED HEALTH CARE EDUCATION/TRAINING PROGRAM

## 2025-04-15 PROCEDURE — 1123F ACP DISCUSS/DSCN MKR DOCD: CPT | Performed by: STUDENT IN AN ORGANIZED HEALTH CARE EDUCATION/TRAINING PROGRAM

## 2025-04-15 RX ORDER — ESOMEPRAZOLE MAGNESIUM 40 MG/1
40 CAPSULE, DELAYED RELEASE ORAL DAILY
Qty: 90 CAPSULE | Refills: 1 | Status: SHIPPED | OUTPATIENT
Start: 2025-04-15

## 2025-04-15 NOTE — PROGRESS NOTES
Subjective   Patient ID: Fadumo Lassiter is a 69 y.o. female who presents for Follow-up and Med Refill.    Med Refill         Presents for follow-up  Reports a little more low back pain since her last visit and notes that it has changed slightly which in the past had indicated some change on her imaging that had led to surgery. Had been started on Prolia during this year for osteoporosis  Continues to work on decreasing oxycodone and is down to 1.5 tablets daily that separates into 4 different dosages during the day    Review of Systems    8 point review of systems is otherwise negative unless mentioned on HPI      Objective   /84   Wt 99.8 kg (220 lb)   BMI 42.97 kg/m²     Physical Exam  Constitutional:       General: She is not in acute distress.  HENT:      Head: Atraumatic.   Eyes:      Extraocular Movements: Extraocular movements intact.   Abdominal:      Palpations: Abdomen is soft.   Musculoskeletal:         General: No swelling.   Skin:     Findings: No rash.   Neurological:      Mental Status: Mental status is at baseline.   Psychiatric:         Mood and Affect: Mood normal.             Assessment/Plan       Chronic low back pain  History of multiple spinal surgeries  -Follows with spine surgery  -Had been on OxyContin in the past and was able to wean off  -Discussed goal to continue to wean opiate pain medications with goal of discontinuing completely  -At the beginning was on oxycodone 15 mg 3 times daily  -Has been able to decrease to oxycodone 5 mg tablets and takes 1.5 tablets daily  -Restless leg syndrome has been increased as oxycodone is decreased, in the past had discussed ropinirole, opted to hold off for now  -Had been on gabapentin and lyrica in the past that were stopped for side effects  -Positive WHITNEY, has seen rheumatology with reassuring workup, considering starting silvina chi  -Not currently interested in injections at this time to follow-up with pain management   -Takes naproxen  sparingly, discussed option of other NSAIDs (meloxicam or diclofenac), would like to continue with naproxen as needed for now   -OAARS reviewed, Most recent urine screen through [facility], 1/2025  -Plans to follow-up with her spine surgeon about some changes in quality in the pain that she has had recently      Sinusitis  -Flonase and continue antihistamine     Osteoporosis  -Following with [facility], has been started on Prolia in 2025  -Vitamin D/calcium supplement, weight bearing exercise       Hypertension  -Continue lisinopril 5 mg daily  -Continue atenolol 25 mg daily (takes 1/2 50mg atenolol), may consider increasing back to 50mg in the future       Possible Achilles tendinitis  -Discussed some additional stretching exercises to be done before activity. Has been limited exercise as a result of the discomfort     History of dental infection  -Was on Augmentin, tooth has since been removed, planned for bridge. Some intermittent times of diarrhea but then on other days will have more normal bowel movements  -Due to history of orthopedic joint replacement, had been recommended to take prophylactic antibiotics prior to dental procedures.       Prior episode of dysuria  -Ordered UA/culture for if recurs again  -Discussed cranberry and D mannose supplements      Hypothyroidism  -Continue levothyroxine 88 mcg daily     GERD  -Continue esomeprazole 40 mg daily     Health maintenance  -Follows with GYN through  who orders mammograms and bone density   -Colonoscopy 2022, follow in 10 years  -Received flu and COVID boosters     RTC 1 month     This note was dictated by speech recognition. Minor errors in transcription may be present.

## 2025-04-16 DIAGNOSIS — U07.1 COVID-19: ICD-10-CM

## 2025-04-16 RX ORDER — FLUTICASONE PROPIONATE 50 MCG
1 SPRAY, SUSPENSION (ML) NASAL DAILY
Qty: 16 G | Refills: 5 | Status: SHIPPED | OUTPATIENT
Start: 2025-04-16 | End: 2026-04-16

## 2025-05-07 DIAGNOSIS — I10 HYPERTENSION, UNSPECIFIED TYPE: ICD-10-CM

## 2025-05-07 DIAGNOSIS — M54.50 LUMBAR BACK PAIN: ICD-10-CM

## 2025-05-07 RX ORDER — OXYCODONE HYDROCHLORIDE 5 MG/1
2.5 TABLET ORAL 4 TIMES DAILY
Qty: 60 TABLET | Refills: 0 | Status: SHIPPED | OUTPATIENT
Start: 2025-05-07 | End: 2025-06-06

## 2025-05-07 RX ORDER — LISINOPRIL 5 MG/1
5 TABLET ORAL DAILY
Qty: 90 TABLET | Refills: 0 | Status: SHIPPED | OUTPATIENT
Start: 2025-05-07

## 2025-05-13 ENCOUNTER — APPOINTMENT (OUTPATIENT)
Dept: PRIMARY CARE | Facility: CLINIC | Age: 70
End: 2025-05-13
Payer: MEDICARE

## 2025-05-30 ENCOUNTER — APPOINTMENT (OUTPATIENT)
Dept: PRIMARY CARE | Facility: CLINIC | Age: 70
End: 2025-05-30
Payer: MEDICARE

## 2025-05-30 PROCEDURE — NOSHO NO SHOW VISIT: Performed by: STUDENT IN AN ORGANIZED HEALTH CARE EDUCATION/TRAINING PROGRAM

## 2025-06-09 DIAGNOSIS — I10 HYPERTENSION, UNSPECIFIED TYPE: ICD-10-CM

## 2025-06-11 ENCOUNTER — APPOINTMENT (OUTPATIENT)
Dept: PRIMARY CARE | Facility: CLINIC | Age: 70
End: 2025-06-11
Payer: MEDICARE

## 2025-06-11 VITALS — BODY MASS INDEX: 42.77 KG/M2 | SYSTOLIC BLOOD PRESSURE: 121 MMHG | WEIGHT: 219 LBS | DIASTOLIC BLOOD PRESSURE: 78 MMHG

## 2025-06-11 DIAGNOSIS — M54.50 LUMBAR BACK PAIN: Primary | ICD-10-CM

## 2025-06-11 DIAGNOSIS — I10 HYPERTENSION, UNSPECIFIED TYPE: ICD-10-CM

## 2025-06-11 PROCEDURE — 1036F TOBACCO NON-USER: CPT | Performed by: STUDENT IN AN ORGANIZED HEALTH CARE EDUCATION/TRAINING PROGRAM

## 2025-06-11 PROCEDURE — G2211 COMPLEX E/M VISIT ADD ON: HCPCS | Performed by: STUDENT IN AN ORGANIZED HEALTH CARE EDUCATION/TRAINING PROGRAM

## 2025-06-11 PROCEDURE — 3078F DIAST BP <80 MM HG: CPT | Performed by: STUDENT IN AN ORGANIZED HEALTH CARE EDUCATION/TRAINING PROGRAM

## 2025-06-11 PROCEDURE — 3074F SYST BP LT 130 MM HG: CPT | Performed by: STUDENT IN AN ORGANIZED HEALTH CARE EDUCATION/TRAINING PROGRAM

## 2025-06-11 PROCEDURE — 99213 OFFICE O/P EST LOW 20 MIN: CPT | Performed by: STUDENT IN AN ORGANIZED HEALTH CARE EDUCATION/TRAINING PROGRAM

## 2025-06-11 PROCEDURE — 1160F RVW MEDS BY RX/DR IN RCRD: CPT | Performed by: STUDENT IN AN ORGANIZED HEALTH CARE EDUCATION/TRAINING PROGRAM

## 2025-06-11 PROCEDURE — 1159F MED LIST DOCD IN RCRD: CPT | Performed by: STUDENT IN AN ORGANIZED HEALTH CARE EDUCATION/TRAINING PROGRAM

## 2025-06-11 RX ORDER — ATENOLOL 50 MG/1
25 TABLET ORAL DAILY
Qty: 45 TABLET | Refills: 0 | OUTPATIENT
Start: 2025-06-11

## 2025-06-11 RX ORDER — ATENOLOL 50 MG/1
25 TABLET ORAL DAILY
Qty: 45 TABLET | Refills: 0 | Status: SHIPPED | OUTPATIENT
Start: 2025-06-11

## 2025-06-11 NOTE — PROGRESS NOTES
Subjective   Patient ID: Fadumo Lassiter is a 70 y.o. female who presents for Follow-up and Med Refill.    Med Refill         Follow-up and medication refill.  Has upcoming family trip to [place] will also be meeting with family member still in [place].  Has had 2 occurrences of presyncope, 1 was on a airplane, otherwise yesterday evening does not doing anything out of the ordinary.  Plan to start monitoring blood pressure little bit closer to see if there is any abnormalities.  Also notes that her resting heart rate is usually in the upper 50s.  Has decrease oxycodone to 5 mg daily.  Splits in a different times for when symptoms are most significant.      Review of Systems    8 point review of systems is otherwise negative unless mentioned on HPI      Objective   /78   Wt 99.3 kg (219 lb)   BMI 42.77 kg/m²     Physical Exam  Constitutional:       General: She is not in acute distress.  HENT:      Head: Atraumatic.   Eyes:      Extraocular Movements: Extraocular movements intact.   Cardiovascular:      Rate and Rhythm: Normal rate and regular rhythm.      Heart sounds: No murmur heard.  Pulmonary:      Breath sounds: No wheezing.   Skin:     Findings: No rash.   Neurological:      Mental Status: Mental status is at baseline.   Psychiatric:         Mood and Affect: Mood normal.         Assessment/Plan       Chronic low back pain  History of multiple spinal surgeries  -Follows with spine surgery  -Had been on OxyContin in the past and was able to wean off  -Discussed goal to continue to wean opiate pain medications with goal of discontinuing completely  -At the beginning was on oxycodone 15 mg 3 times daily  -Has been able to decrease to oxycodone 5 mg daily  -Restless leg syndrome has been increased as oxycodone is decreased, in the past had discussed ropinirole, opted to hold off for now  -Had been on gabapentin and lyrica in the past that were stopped for side effects  -Positive WHITNEY, has seen  rheumatology with reassuring workup, considering starting silvina chi  -Not currently interested in injections at this time to follow-up with pain management   -Takes naproxen sparingly, discussed option of other NSAIDs (meloxicam or diclofenac), would like to continue with naproxen as needed for now   -OAARS reviewed, Most recent urine screen through [facility], 1/2025  -Plans to follow-up with her spine surgeon about some changes in quality in the pain that she has had recently      Sinusitis  -Flonase and continue antihistamine     Osteoporosis  -Following with [facility], has been started on Prolia in 2025  -Vitamin D/calcium supplement, weight bearing exercise       Hypertension  -Continue lisinopril 5 mg daily  -Continue atenolol 25 mg daily (takes 1/2 50mg atenolol)  -Will start monitoring BP and HR a little bit more at home and bring log to next visit      Possible Achilles tendinitis  -Discussed some additional stretching exercises to be done before activity. Has been limited exercise as a result of the discomfort     History of dental infection  -Was on Augmentin, tooth has since been removed, planned for bridge. Some intermittent times of diarrhea but then on other days will have more normal bowel movements  -Due to history of orthopedic joint replacement, had been recommended to take prophylactic antibiotics prior to dental procedures.       Prior episode of dysuria  -Ordered UA/culture for if recurs again  -Discussed cranberry and D mannose supplements      Hypothyroidism  -Continue levothyroxine 88 mcg daily     GERD  -Continue esomeprazole 40 mg daily     Health maintenance  -Follows with GYN through  who orders mammograms and bone density   -Colonoscopy 2022, follow in 10 years  -Received flu and COVID boosters     RTC 1 month     This note was dictated by speech recognition. Minor errors in transcription may be present

## 2025-06-13 DIAGNOSIS — M54.50 LUMBAR BACK PAIN: ICD-10-CM

## 2025-06-13 RX ORDER — OXYCODONE HYDROCHLORIDE 5 MG/1
2.5 TABLET ORAL 4 TIMES DAILY
Qty: 60 TABLET | Refills: 0 | Status: SHIPPED | OUTPATIENT
Start: 2025-06-13 | End: 2025-07-13

## 2025-06-25 DIAGNOSIS — Z12.31 ENCOUNTER FOR SCREENING MAMMOGRAM FOR BREAST CANCER: ICD-10-CM

## 2025-07-15 ENCOUNTER — APPOINTMENT (OUTPATIENT)
Dept: PRIMARY CARE | Facility: CLINIC | Age: 70
End: 2025-07-15
Payer: MEDICARE

## 2025-07-22 ENCOUNTER — TELEPHONE (OUTPATIENT)
Dept: PRIMARY CARE | Facility: CLINIC | Age: 70
End: 2025-07-22
Payer: MEDICARE

## 2025-07-22 RX ORDER — OXYCODONE HYDROCHLORIDE 5 MG/1
5 TABLET ORAL 4 TIMES DAILY
COMMUNITY
End: 2025-07-22

## 2025-07-22 NOTE — TELEPHONE ENCOUNTER
Patient calling requesting refill on her oxycodone. Not on patient's list. I tried to order, but could not change the instructions. (Should say take 0.5 tabs (25mg) by mouth 4 times a day) could you please order and [facility] [place]. thanks

## 2025-07-23 DIAGNOSIS — M54.50 LUMBAR BACK PAIN: Primary | ICD-10-CM

## 2025-07-23 RX ORDER — OXYCODONE HYDROCHLORIDE 5 MG/1
2.5 TABLET ORAL EVERY 6 HOURS PRN
Qty: 60 TABLET | Refills: 0 | Status: SHIPPED | OUTPATIENT
Start: 2025-07-23

## 2025-08-04 DIAGNOSIS — E03.9 HYPOTHYROIDISM, UNSPECIFIED TYPE: ICD-10-CM

## 2025-08-04 RX ORDER — LEVOTHYROXINE SODIUM 88 UG/1
TABLET ORAL
Qty: 96 TABLET | Refills: 1 | Status: SHIPPED | OUTPATIENT
Start: 2025-08-04

## 2025-08-13 ENCOUNTER — APPOINTMENT (OUTPATIENT)
Dept: PRIMARY CARE | Facility: CLINIC | Age: 70
End: 2025-08-13
Payer: MEDICARE

## 2025-08-13 VITALS — SYSTOLIC BLOOD PRESSURE: 118 MMHG | BODY MASS INDEX: 43.36 KG/M2 | DIASTOLIC BLOOD PRESSURE: 76 MMHG | WEIGHT: 222 LBS

## 2025-08-13 DIAGNOSIS — M54.50 LUMBAR BACK PAIN: Primary | ICD-10-CM

## 2025-08-13 PROCEDURE — 1160F RVW MEDS BY RX/DR IN RCRD: CPT | Performed by: STUDENT IN AN ORGANIZED HEALTH CARE EDUCATION/TRAINING PROGRAM

## 2025-08-13 PROCEDURE — 3074F SYST BP LT 130 MM HG: CPT | Performed by: STUDENT IN AN ORGANIZED HEALTH CARE EDUCATION/TRAINING PROGRAM

## 2025-08-13 PROCEDURE — 3078F DIAST BP <80 MM HG: CPT | Performed by: STUDENT IN AN ORGANIZED HEALTH CARE EDUCATION/TRAINING PROGRAM

## 2025-08-13 PROCEDURE — 99213 OFFICE O/P EST LOW 20 MIN: CPT | Performed by: STUDENT IN AN ORGANIZED HEALTH CARE EDUCATION/TRAINING PROGRAM

## 2025-08-13 PROCEDURE — G2211 COMPLEX E/M VISIT ADD ON: HCPCS | Performed by: STUDENT IN AN ORGANIZED HEALTH CARE EDUCATION/TRAINING PROGRAM

## 2025-08-13 PROCEDURE — 1159F MED LIST DOCD IN RCRD: CPT | Performed by: STUDENT IN AN ORGANIZED HEALTH CARE EDUCATION/TRAINING PROGRAM

## 2025-08-18 DIAGNOSIS — I10 HYPERTENSION, UNSPECIFIED TYPE: ICD-10-CM

## 2025-08-18 RX ORDER — LISINOPRIL 5 MG/1
5 TABLET ORAL DAILY
Qty: 90 TABLET | Refills: 0 | Status: SHIPPED | OUTPATIENT
Start: 2025-08-18

## 2025-09-04 DIAGNOSIS — M54.50 LUMBAR BACK PAIN: ICD-10-CM

## 2025-09-04 DIAGNOSIS — I10 HYPERTENSION, UNSPECIFIED TYPE: ICD-10-CM

## 2025-09-04 RX ORDER — ATENOLOL 50 MG/1
25 TABLET ORAL DAILY
Qty: 45 TABLET | Refills: 0 | Status: SHIPPED | OUTPATIENT
Start: 2025-09-04

## 2025-09-04 RX ORDER — OXYCODONE HYDROCHLORIDE 5 MG/1
2.5 TABLET ORAL EVERY 12 HOURS PRN
Qty: 30 TABLET | Refills: 0 | Status: SHIPPED | OUTPATIENT
Start: 2025-09-04 | End: 2025-10-07

## 2025-10-14 ENCOUNTER — APPOINTMENT (OUTPATIENT)
Dept: PRIMARY CARE | Facility: CLINIC | Age: 70
End: 2025-10-14
Payer: MEDICARE